# Patient Record
Sex: MALE | Race: WHITE | NOT HISPANIC OR LATINO
[De-identification: names, ages, dates, MRNs, and addresses within clinical notes are randomized per-mention and may not be internally consistent; named-entity substitution may affect disease eponyms.]

---

## 2017-01-26 ENCOUNTER — APPOINTMENT (OUTPATIENT)
Dept: NEPHROLOGY | Facility: CLINIC | Age: 72
End: 2017-01-26

## 2017-01-26 VITALS
HEART RATE: 79 BPM | HEIGHT: 66 IN | WEIGHT: 191 LBS | DIASTOLIC BLOOD PRESSURE: 86 MMHG | SYSTOLIC BLOOD PRESSURE: 145 MMHG | OXYGEN SATURATION: 98 % | BODY MASS INDEX: 30.7 KG/M2

## 2017-01-26 VITALS — BODY MASS INDEX: 22.98 KG/M2 | WEIGHT: 142.38 LBS

## 2017-01-30 LAB
ALBUMIN SERPL ELPH-MCNC: 4.3 G/DL
ALP BLD-CCNC: 77 U/L
ALT SERPL-CCNC: 44 U/L
ANION GAP SERPL CALC-SCNC: 15 MMOL/L
APPEARANCE: CLEAR
AST SERPL-CCNC: 30 U/L
BACTERIA: NEGATIVE
BASOPHILS # BLD AUTO: 0.03 K/UL
BASOPHILS NFR BLD AUTO: 0.2 %
BILIRUB SERPL-MCNC: 0.6 MG/DL
BILIRUBIN URINE: NEGATIVE
BLOOD URINE: NEGATIVE
BUN SERPL-MCNC: 28 MG/DL
CALCIUM SERPL-MCNC: 9.9 MG/DL
CHLORIDE SERPL-SCNC: 101 MMOL/L
CHOLEST SERPL-MCNC: 131 MG/DL
CHOLEST/HDLC SERPL: 1.9 RATIO
CO2 SERPL-SCNC: 24 MMOL/L
COLOR: YELLOW
CREAT SERPL-MCNC: 1.49 MG/DL
CREAT SPEC-SCNC: 32 MG/DL
CREAT/PROT UR: 0.5 RATIO
EOSINOPHIL # BLD AUTO: 0.23 K/UL
EOSINOPHIL NFR BLD AUTO: 1.9 %
GLUCOSE QUALITATIVE U: NORMAL MG/DL
GLUCOSE SERPL-MCNC: 167 MG/DL
HCT VFR BLD CALC: 44.4 %
HDLC SERPL-MCNC: 68 MG/DL
HGB BLD-MCNC: 14.7 G/DL
IMM GRANULOCYTES NFR BLD AUTO: 0.3 %
KETONES URINE: NEGATIVE
LDLC SERPL CALC-MCNC: 38 MG/DL
LEUKOCYTE ESTERASE URINE: NEGATIVE
LYMPHOCYTES # BLD AUTO: 2.72 K/UL
LYMPHOCYTES NFR BLD AUTO: 22.4 %
MAN DIFF?: NORMAL
MCHC RBC-ENTMCNC: 33 PG
MCHC RBC-ENTMCNC: 33.1 GM/DL
MCV RBC AUTO: 99.8 FL
MICROSCOPIC-UA: NORMAL
MONOCYTES # BLD AUTO: 1.26 K/UL
MONOCYTES NFR BLD AUTO: 10.4 %
NEUTROPHILS # BLD AUTO: 7.89 K/UL
NEUTROPHILS NFR BLD AUTO: 64.8 %
NITRITE URINE: NEGATIVE
PH URINE: 6
PLATELET # BLD AUTO: 242 K/UL
POTASSIUM SERPL-SCNC: 4.5 MMOL/L
PROT SERPL-MCNC: 7.2 G/DL
PROT UR-MCNC: 16 MG/DL
PROTEIN URINE: NEGATIVE MG/DL
RBC # BLD: 4.45 M/UL
RBC # FLD: 13.1 %
RED BLOOD CELLS URINE: 0 /HPF
SODIUM SERPL-SCNC: 140 MMOL/L
SPECIFIC GRAVITY URINE: 1.01
SQUAMOUS EPITHELIAL CELLS: 1 /HPF
TRIGL SERPL-MCNC: 124 MG/DL
UROBILINOGEN URINE: NORMAL MG/DL
WBC # FLD AUTO: 12.17 K/UL
WHITE BLOOD CELLS URINE: 2 /HPF

## 2017-02-06 ENCOUNTER — APPOINTMENT (OUTPATIENT)
Dept: OTOLARYNGOLOGY | Facility: CLINIC | Age: 72
End: 2017-02-06

## 2017-02-06 VITALS
DIASTOLIC BLOOD PRESSURE: 86 MMHG | HEIGHT: 66 IN | SYSTOLIC BLOOD PRESSURE: 162 MMHG | BODY MASS INDEX: 22.82 KG/M2 | WEIGHT: 142 LBS | HEART RATE: 77 BPM

## 2017-02-13 ENCOUNTER — RX RENEWAL (OUTPATIENT)
Age: 72
End: 2017-02-13

## 2017-02-22 ENCOUNTER — APPOINTMENT (OUTPATIENT)
Dept: OTOLARYNGOLOGY | Facility: CLINIC | Age: 72
End: 2017-02-22

## 2017-02-22 VITALS
HEART RATE: 75 BPM | HEIGHT: 66 IN | WEIGHT: 142 LBS | SYSTOLIC BLOOD PRESSURE: 146 MMHG | DIASTOLIC BLOOD PRESSURE: 82 MMHG | BODY MASS INDEX: 22.82 KG/M2

## 2017-02-22 DIAGNOSIS — J39.2 OTHER DISEASES OF PHARYNX: ICD-10-CM

## 2017-04-27 ENCOUNTER — APPOINTMENT (OUTPATIENT)
Dept: NEPHROLOGY | Facility: CLINIC | Age: 72
End: 2017-04-27

## 2017-04-27 VITALS
RESPIRATION RATE: 95 BRPM | HEART RATE: 70 BPM | WEIGHT: 197 LBS | DIASTOLIC BLOOD PRESSURE: 76 MMHG | HEIGHT: 65 IN | BODY MASS INDEX: 32.82 KG/M2 | SYSTOLIC BLOOD PRESSURE: 125 MMHG

## 2017-04-27 DIAGNOSIS — K27.9 PEPTIC ULCER, SITE UNSPECIFIED, UNSPECIFIED AS ACUTE OR CHRONIC, W/OUT HEMORRHAGE OR PERFORATION: ICD-10-CM

## 2017-05-01 LAB
ALBUMIN SERPL ELPH-MCNC: 4.5 G/DL
ALP BLD-CCNC: 76 U/L
ALT SERPL-CCNC: 26 U/L
ANION GAP SERPL CALC-SCNC: 19 MMOL/L
APPEARANCE: CLEAR
AST SERPL-CCNC: 20 U/L
BACTERIA: NEGATIVE
BILIRUB SERPL-MCNC: 0.6 MG/DL
BILIRUBIN URINE: NEGATIVE
BLOOD URINE: NEGATIVE
BUN SERPL-MCNC: 43 MG/DL
CALCIUM SERPL-MCNC: 10.5 MG/DL
CHLORIDE SERPL-SCNC: 103 MMOL/L
CO2 SERPL-SCNC: 19 MMOL/L
COLOR: YELLOW
CREAT SERPL-MCNC: 1.77 MG/DL
CREAT SPEC-SCNC: 28 MG/DL
CREAT/PROT UR: 0.4 RATIO
GLUCOSE QUALITATIVE U: NORMAL MG/DL
GLUCOSE SERPL-MCNC: 223 MG/DL
HYALINE CASTS: 0 /LPF
KETONES URINE: NEGATIVE
LEUKOCYTE ESTERASE URINE: NEGATIVE
MICROSCOPIC-UA: NORMAL
NITRITE URINE: NEGATIVE
PH URINE: 6.5
POTASSIUM SERPL-SCNC: 4.2 MMOL/L
PROT SERPL-MCNC: 7.2 G/DL
PROT UR-MCNC: 11 MG/DL
PROTEIN URINE: NEGATIVE MG/DL
RED BLOOD CELLS URINE: 0 /HPF
SODIUM SERPL-SCNC: 141 MMOL/L
SPECIFIC GRAVITY URINE: 1.01
SQUAMOUS EPITHELIAL CELLS: 0 /HPF
UROBILINOGEN URINE: NORMAL MG/DL
WHITE BLOOD CELLS URINE: 0 /HPF

## 2017-05-24 ENCOUNTER — APPOINTMENT (OUTPATIENT)
Dept: OTOLARYNGOLOGY | Facility: CLINIC | Age: 72
End: 2017-05-24

## 2017-05-24 VITALS
DIASTOLIC BLOOD PRESSURE: 84 MMHG | HEART RATE: 68 BPM | HEIGHT: 65 IN | BODY MASS INDEX: 32.82 KG/M2 | WEIGHT: 197 LBS | SYSTOLIC BLOOD PRESSURE: 133 MMHG

## 2017-05-24 VITALS
WEIGHT: 197 LBS | BODY MASS INDEX: 32.82 KG/M2 | SYSTOLIC BLOOD PRESSURE: 146 MMHG | DIASTOLIC BLOOD PRESSURE: 79 MMHG | HEIGHT: 65 IN | HEART RATE: 69 BPM

## 2017-07-27 ENCOUNTER — APPOINTMENT (OUTPATIENT)
Dept: NEPHROLOGY | Facility: CLINIC | Age: 72
End: 2017-07-27
Payer: COMMERCIAL

## 2017-07-27 VITALS
DIASTOLIC BLOOD PRESSURE: 84 MMHG | HEART RATE: 79 BPM | OXYGEN SATURATION: 99 % | WEIGHT: 194 LBS | SYSTOLIC BLOOD PRESSURE: 126 MMHG | HEIGHT: 65 IN | BODY MASS INDEX: 32.32 KG/M2

## 2017-07-27 PROCEDURE — 99214 OFFICE O/P EST MOD 30 MIN: CPT

## 2017-07-31 LAB
24R-OH-CALCIDIOL SERPL-MCNC: 16.4 PG/ML
25(OH)D3 SERPL-MCNC: 36.4 NG/ML
ALBUMIN SERPL ELPH-MCNC: 4.3 G/DL
ALP BLD-CCNC: 78 U/L
ALT SERPL-CCNC: 35 U/L
ANION GAP SERPL CALC-SCNC: 16 MMOL/L
APPEARANCE: CLEAR
AST SERPL-CCNC: 25 U/L
BACTERIA: NEGATIVE
BASOPHILS # BLD AUTO: 0.02 K/UL
BASOPHILS NFR BLD AUTO: 0.2 %
BILIRUB SERPL-MCNC: 0.6 MG/DL
BILIRUBIN URINE: NEGATIVE
BLOOD URINE: NEGATIVE
BUN SERPL-MCNC: 42 MG/DL
CALCIUM SERPL-MCNC: 10 MG/DL
CALCIUM SERPL-MCNC: 10 MG/DL
CHLORIDE SERPL-SCNC: 102 MMOL/L
CHOLEST SERPL-MCNC: 112 MG/DL
CHOLEST/HDLC SERPL: 1.9 RATIO
CO2 SERPL-SCNC: 22 MMOL/L
COLOR: YELLOW
CREAT SERPL-MCNC: 1.49 MG/DL
CREAT SPEC-SCNC: 17 MG/DL
CREAT/PROT UR: 0.7 RATIO
EOSINOPHIL # BLD AUTO: 0.11 K/UL
EOSINOPHIL NFR BLD AUTO: 1 %
GLUCOSE QUALITATIVE U: NORMAL MG/DL
GLUCOSE SERPL-MCNC: 154 MG/DL
HBA1C MFR BLD HPLC: 8.4 %
HCT VFR BLD CALC: 42.2 %
HDLC SERPL-MCNC: 58 MG/DL
HGB BLD-MCNC: 14.1 G/DL
IMM GRANULOCYTES NFR BLD AUTO: 0.4 %
KETONES URINE: NEGATIVE
LDLC SERPL CALC-MCNC: 34 MG/DL
LEUKOCYTE ESTERASE URINE: NEGATIVE
LYMPHOCYTES # BLD AUTO: 2.5 K/UL
LYMPHOCYTES NFR BLD AUTO: 23.3 %
MAN DIFF?: NORMAL
MCHC RBC-ENTMCNC: 31.7 PG
MCHC RBC-ENTMCNC: 33.4 GM/DL
MCV RBC AUTO: 94.8 FL
MICROSCOPIC-UA: NORMAL
MONOCYTES # BLD AUTO: 1.38 K/UL
MONOCYTES NFR BLD AUTO: 12.9 %
NEUTROPHILS # BLD AUTO: 6.67 K/UL
NEUTROPHILS NFR BLD AUTO: 62.2 %
NITRITE URINE: NEGATIVE
PARATHYROID HORMONE INTACT: 64 PG/ML
PH URINE: 6
PHOSPHATE SERPL-MCNC: 3 MG/DL
PLATELET # BLD AUTO: 240 K/UL
POTASSIUM SERPL-SCNC: 4.4 MMOL/L
PROT SERPL-MCNC: 7.2 G/DL
PROT UR-MCNC: 12 MG/DL
PROTEIN URINE: NEGATIVE MG/DL
RBC # BLD: 4.45 M/UL
RBC # FLD: 13.6 %
RED BLOOD CELLS URINE: 0 /HPF
SODIUM SERPL-SCNC: 140 MMOL/L
SPECIFIC GRAVITY URINE: 1.01
SQUAMOUS EPITHELIAL CELLS: 0 /HPF
TRIGL SERPL-MCNC: 98 MG/DL
UROBILINOGEN URINE: NORMAL MG/DL
WBC # FLD AUTO: 10.72 K/UL
WHITE BLOOD CELLS URINE: 0 /HPF

## 2017-08-14 ENCOUNTER — APPOINTMENT (OUTPATIENT)
Dept: OTOLARYNGOLOGY | Facility: CLINIC | Age: 72
End: 2017-08-14
Payer: COMMERCIAL

## 2017-08-14 VITALS
BODY MASS INDEX: 32.65 KG/M2 | HEIGHT: 65 IN | WEIGHT: 196 LBS | DIASTOLIC BLOOD PRESSURE: 69 MMHG | SYSTOLIC BLOOD PRESSURE: 124 MMHG | HEART RATE: 72 BPM

## 2017-08-14 PROCEDURE — 92557 COMPREHENSIVE HEARING TEST: CPT

## 2017-08-14 PROCEDURE — G0268 REMOVAL OF IMPACTED WAX MD: CPT

## 2017-08-14 PROCEDURE — 92567 TYMPANOMETRY: CPT

## 2017-08-14 PROCEDURE — 99213 OFFICE O/P EST LOW 20 MIN: CPT | Mod: 25

## 2017-08-16 ENCOUNTER — APPOINTMENT (OUTPATIENT)
Dept: OTOLARYNGOLOGY | Facility: CLINIC | Age: 72
End: 2017-08-16
Payer: COMMERCIAL

## 2017-08-16 VITALS
SYSTOLIC BLOOD PRESSURE: 120 MMHG | BODY MASS INDEX: 32.65 KG/M2 | DIASTOLIC BLOOD PRESSURE: 68 MMHG | WEIGHT: 196 LBS | HEART RATE: 71 BPM | HEIGHT: 65 IN

## 2017-08-16 PROCEDURE — 69210 REMOVE IMPACTED EAR WAX UNI: CPT

## 2017-08-16 PROCEDURE — 99214 OFFICE O/P EST MOD 30 MIN: CPT | Mod: 25

## 2017-08-16 RX ORDER — NEOMYCIN SULFATE, POLYMYXIN B SULFATE AND HYDROCORTISONE 3.5; 10000; 1 MG/ML; [IU]/ML; MG/ML
3.5-10000-1 SOLUTION AURICULAR (OTIC)
Qty: 10 | Refills: 5 | Status: ACTIVE | COMMUNITY
Start: 2017-08-16 | End: 1900-01-01

## 2017-08-29 ENCOUNTER — APPOINTMENT (OUTPATIENT)
Dept: OTOLARYNGOLOGY | Facility: CLINIC | Age: 72
End: 2017-08-29
Payer: COMMERCIAL

## 2017-08-29 ENCOUNTER — APPOINTMENT (OUTPATIENT)
Dept: PHARMACY | Facility: CLINIC | Age: 72
End: 2017-08-29
Payer: SELF-PAY

## 2017-08-29 VITALS
WEIGHT: 196 LBS | BODY MASS INDEX: 32.65 KG/M2 | SYSTOLIC BLOOD PRESSURE: 121 MMHG | HEART RATE: 75 BPM | HEIGHT: 65 IN | DIASTOLIC BLOOD PRESSURE: 78 MMHG

## 2017-08-29 PROCEDURE — 99213 OFFICE O/P EST LOW 20 MIN: CPT

## 2017-08-29 PROCEDURE — V5014E: CUSTOM

## 2017-08-29 RX ORDER — FLUTICASONE PROPIONATE AND SALMETEROL 50; 250 UG/1; UG/1
250-50 POWDER RESPIRATORY (INHALATION)
Qty: 60 | Refills: 0 | Status: ACTIVE | COMMUNITY
Start: 2017-05-05

## 2017-08-29 RX ORDER — INSULIN ASPART 100 [IU]/ML
100 INJECTION, SOLUTION INTRAVENOUS; SUBCUTANEOUS
Qty: 15 | Refills: 0 | Status: ACTIVE | COMMUNITY
Start: 2017-05-17

## 2017-08-29 RX ORDER — GLIMEPIRIDE 4 MG/1
4 TABLET ORAL
Qty: 180 | Refills: 0 | Status: ACTIVE | COMMUNITY
Start: 2017-06-15

## 2017-08-29 RX ORDER — BLOOD SUGAR DIAGNOSTIC
STRIP MISCELLANEOUS
Qty: 900 | Refills: 0 | Status: ACTIVE | COMMUNITY
Start: 2017-03-11

## 2017-09-06 ENCOUNTER — APPOINTMENT (OUTPATIENT)
Dept: OTOLARYNGOLOGY | Facility: CLINIC | Age: 72
End: 2017-09-06

## 2017-09-18 ENCOUNTER — APPOINTMENT (OUTPATIENT)
Dept: PHARMACY | Facility: CLINIC | Age: 72
End: 2017-09-18
Payer: SELF-PAY

## 2017-09-18 PROCEDURE — V5266B: CUSTOM

## 2017-09-25 ENCOUNTER — APPOINTMENT (OUTPATIENT)
Dept: PHARMACY | Facility: CLINIC | Age: 72
End: 2017-09-25
Payer: SELF-PAY

## 2017-09-25 PROCEDURE — V5299A: CUSTOM | Mod: NC

## 2017-11-09 ENCOUNTER — APPOINTMENT (OUTPATIENT)
Dept: NEPHROLOGY | Facility: CLINIC | Age: 72
End: 2017-11-09

## 2018-02-05 ENCOUNTER — APPOINTMENT (OUTPATIENT)
Dept: OTOLARYNGOLOGY | Facility: CLINIC | Age: 73
End: 2018-02-05
Payer: COMMERCIAL

## 2018-02-05 VITALS
DIASTOLIC BLOOD PRESSURE: 87 MMHG | HEIGHT: 65 IN | WEIGHT: 196 LBS | SYSTOLIC BLOOD PRESSURE: 163 MMHG | BODY MASS INDEX: 32.65 KG/M2 | HEART RATE: 71 BPM

## 2018-02-05 DIAGNOSIS — H60.312 DIFFUSE OTITIS EXTERNA, LEFT EAR: ICD-10-CM

## 2018-02-05 PROCEDURE — 69210 REMOVE IMPACTED EAR WAX UNI: CPT

## 2018-02-05 PROCEDURE — 99214 OFFICE O/P EST MOD 30 MIN: CPT | Mod: 25

## 2018-02-16 ENCOUNTER — APPOINTMENT (OUTPATIENT)
Dept: OTOLARYNGOLOGY | Facility: CLINIC | Age: 73
End: 2018-02-16
Payer: COMMERCIAL

## 2018-02-16 VITALS
BODY MASS INDEX: 32.65 KG/M2 | DIASTOLIC BLOOD PRESSURE: 73 MMHG | WEIGHT: 196 LBS | HEIGHT: 65 IN | HEART RATE: 74 BPM | SYSTOLIC BLOOD PRESSURE: 139 MMHG

## 2018-02-16 DIAGNOSIS — H90.3 SENSORINEURAL HEARING LOSS, BILATERAL: ICD-10-CM

## 2018-02-16 DIAGNOSIS — J31.0 CHRONIC RHINITIS: ICD-10-CM

## 2018-02-16 DIAGNOSIS — H61.23 IMPACTED CERUMEN, BILATERAL: ICD-10-CM

## 2018-02-16 DIAGNOSIS — E66.9 OBESITY, UNSPECIFIED: ICD-10-CM

## 2018-02-16 DIAGNOSIS — J34.2 DEVIATED NASAL SEPTUM: ICD-10-CM

## 2018-02-16 DIAGNOSIS — J30.0 VASOMOTOR RHINITIS: ICD-10-CM

## 2018-02-16 PROCEDURE — 31231 NASAL ENDOSCOPY DX: CPT

## 2018-02-16 PROCEDURE — 99214 OFFICE O/P EST MOD 30 MIN: CPT | Mod: 25

## 2018-02-16 RX ORDER — AZELASTINE HYDROCHLORIDE 137 UG/1
0.1 SPRAY, METERED NASAL DAILY
Qty: 1 | Refills: 10 | Status: ACTIVE | COMMUNITY
Start: 2018-02-16 | End: 1900-01-01

## 2018-02-20 ENCOUNTER — RX RENEWAL (OUTPATIENT)
Age: 73
End: 2018-02-20

## 2018-02-20 ENCOUNTER — MEDICATION RENEWAL (OUTPATIENT)
Age: 73
End: 2018-02-20

## 2018-02-20 ENCOUNTER — MESSAGE (OUTPATIENT)
Age: 73
End: 2018-02-20

## 2018-02-20 RX ORDER — FLUTICASONE PROPIONATE 50 UG/1
50 SPRAY, METERED NASAL DAILY
Qty: 1 | Refills: 5 | Status: ACTIVE | COMMUNITY
Start: 2018-02-20 | End: 1900-01-01

## 2018-02-26 ENCOUNTER — RX RENEWAL (OUTPATIENT)
Age: 73
End: 2018-02-26

## 2018-02-28 ENCOUNTER — APPOINTMENT (OUTPATIENT)
Dept: NEPHROLOGY | Facility: CLINIC | Age: 73
End: 2018-02-28
Payer: COMMERCIAL

## 2018-02-28 ENCOUNTER — LABORATORY RESULT (OUTPATIENT)
Age: 73
End: 2018-02-28

## 2018-02-28 ENCOUNTER — MOBILE ON CALL (OUTPATIENT)
Age: 73
End: 2018-02-28

## 2018-02-28 VITALS — BODY MASS INDEX: 32.28 KG/M2 | SYSTOLIC BLOOD PRESSURE: 126 MMHG | WEIGHT: 194 LBS | DIASTOLIC BLOOD PRESSURE: 74 MMHG

## 2018-02-28 PROCEDURE — 99214 OFFICE O/P EST MOD 30 MIN: CPT

## 2018-02-28 RX ORDER — HYDRALAZINE HYDROCHLORIDE 25 MG/1
25 TABLET ORAL
Qty: 90 | Refills: 0 | Status: DISCONTINUED | COMMUNITY
Start: 2018-02-13 | End: 2018-02-28

## 2018-03-01 LAB
ALBUMIN SERPL ELPH-MCNC: 4.9 G/DL
ALP BLD-CCNC: 77 U/L
ALT SERPL-CCNC: 27 U/L
ANION GAP SERPL CALC-SCNC: 14 MMOL/L
APPEARANCE: CLEAR
AST SERPL-CCNC: 25 U/L
BACTERIA: NEGATIVE
BASOPHILS # BLD AUTO: 0.04 K/UL
BASOPHILS NFR BLD AUTO: 0.4 %
BILIRUB SERPL-MCNC: 0.7 MG/DL
BILIRUBIN URINE: NEGATIVE
BLOOD URINE: NEGATIVE
BUN SERPL-MCNC: 38 MG/DL
CALCIUM SERPL-MCNC: 10.6 MG/DL
CALCIUM SERPL-MCNC: 10.6 MG/DL
CHLORIDE SERPL-SCNC: 99 MMOL/L
CHOLEST SERPL-MCNC: 129 MG/DL
CHOLEST/HDLC SERPL: 2.1 RATIO
CO2 SERPL-SCNC: 24 MMOL/L
COLOR: YELLOW
CREAT SERPL-MCNC: 1.83 MG/DL
CREAT SPEC-SCNC: 68 MG/DL
CREAT/PROT UR: 0.4 RATIO
EOSINOPHIL # BLD AUTO: 0.14 K/UL
EOSINOPHIL NFR BLD AUTO: 1.4 %
FERRITIN SERPL-MCNC: 238 NG/ML
GLUCOSE QUALITATIVE U: NEGATIVE MG/DL
GLUCOSE SERPL-MCNC: 172 MG/DL
HCT VFR BLD CALC: 42.8 %
HDLC SERPL-MCNC: 62 MG/DL
HGB BLD-MCNC: 14.1 G/DL
HYALINE CASTS: 3 /LPF
IMM GRANULOCYTES NFR BLD AUTO: 0.4 %
IRON SATN MFR SERPL: 29 %
IRON SERPL-MCNC: 104 UG/DL
KETONES URINE: NEGATIVE
LDLC SERPL CALC-MCNC: 46 MG/DL
LEUKOCYTE ESTERASE URINE: NEGATIVE
LYMPHOCYTES # BLD AUTO: 2.39 K/UL
LYMPHOCYTES NFR BLD AUTO: 24.4 %
MAN DIFF?: NORMAL
MCHC RBC-ENTMCNC: 32 PG
MCHC RBC-ENTMCNC: 32.9 GM/DL
MCV RBC AUTO: 97.1 FL
MICROSCOPIC-UA: NORMAL
MONOCYTES # BLD AUTO: 0.98 K/UL
MONOCYTES NFR BLD AUTO: 10 %
MPO AB + PR3 PNL SER: NORMAL
NEUTROPHILS # BLD AUTO: 6.21 K/UL
NEUTROPHILS NFR BLD AUTO: 63.4 %
NITRITE URINE: NEGATIVE
PARATHYROID HORMONE INTACT: 59 PG/ML
PH URINE: 5.5
PHOSPHATE SERPL-MCNC: 3.5 MG/DL
PLATELET # BLD AUTO: 255 K/UL
POTASSIUM SERPL-SCNC: 4.3 MMOL/L
PROT SERPL-MCNC: 7.2 G/DL
PROT UR-MCNC: 29 MG/DL
PROTEIN URINE: 30 MG/DL
PSA SERPL-MCNC: <0.01 NG/ML
RBC # BLD: 4.41 M/UL
RBC # FLD: 13.7 %
RED BLOOD CELLS URINE: 0 /HPF
SODIUM SERPL-SCNC: 137 MMOL/L
SPECIFIC GRAVITY URINE: 1.01
SQUAMOUS EPITHELIAL CELLS: 0 /HPF
TIBC SERPL-MCNC: 361 UG/DL
TRIGL SERPL-MCNC: 106 MG/DL
UIBC SERPL-MCNC: 257 UG/DL
UROBILINOGEN URINE: NEGATIVE MG/DL
WBC # FLD AUTO: 9.8 K/UL
WHITE BLOOD CELLS URINE: 0 /HPF

## 2018-03-09 LAB — HISTONE AB SER QL: 0.8 UNITS

## 2018-03-23 ENCOUNTER — OTHER (OUTPATIENT)
Age: 73
End: 2018-03-23

## 2018-04-06 ENCOUNTER — APPOINTMENT (OUTPATIENT)
Dept: OTOLARYNGOLOGY | Facility: CLINIC | Age: 73
End: 2018-04-06

## 2018-04-18 ENCOUNTER — APPOINTMENT (OUTPATIENT)
Dept: VASCULAR SURGERY | Facility: CLINIC | Age: 73
End: 2018-04-18

## 2018-05-02 ENCOUNTER — APPOINTMENT (OUTPATIENT)
Dept: VASCULAR SURGERY | Facility: CLINIC | Age: 73
End: 2018-05-02
Payer: COMMERCIAL

## 2018-05-02 VITALS
HEART RATE: 75 BPM | DIASTOLIC BLOOD PRESSURE: 78 MMHG | TEMPERATURE: 97.5 F | HEIGHT: 65 IN | WEIGHT: 194 LBS | BODY MASS INDEX: 32.32 KG/M2 | SYSTOLIC BLOOD PRESSURE: 151 MMHG

## 2018-05-02 DIAGNOSIS — L85.3 XEROSIS CUTIS: ICD-10-CM

## 2018-05-02 PROCEDURE — 93970 EXTREMITY STUDY: CPT

## 2018-05-02 PROCEDURE — 99243 OFF/OP CNSLTJ NEW/EST LOW 30: CPT

## 2018-05-02 PROCEDURE — 93979 VASCULAR STUDY: CPT

## 2018-05-10 ENCOUNTER — APPOINTMENT (OUTPATIENT)
Dept: NEPHROLOGY | Facility: CLINIC | Age: 73
End: 2018-05-10
Payer: COMMERCIAL

## 2018-05-10 VITALS
DIASTOLIC BLOOD PRESSURE: 74 MMHG | HEART RATE: 73 BPM | BODY MASS INDEX: 33.15 KG/M2 | WEIGHT: 199 LBS | OXYGEN SATURATION: 96 % | SYSTOLIC BLOOD PRESSURE: 121 MMHG | HEIGHT: 65 IN

## 2018-05-10 VITALS — DIASTOLIC BLOOD PRESSURE: 78 MMHG | SYSTOLIC BLOOD PRESSURE: 129 MMHG

## 2018-05-10 PROCEDURE — 99214 OFFICE O/P EST MOD 30 MIN: CPT

## 2018-05-14 LAB
ALBUMIN SERPL ELPH-MCNC: 4 G/DL
ALBUMIN SERPL ELPH-MCNC: 4.1 G/DL
ALP BLD-CCNC: 87 U/L
ALP BLD-CCNC: 89 U/L
ALT SERPL-CCNC: 34 U/L
ALT SERPL-CCNC: 34 U/L
ANA PAT FLD IF-IMP: ABNORMAL
ANA SER IF-ACNC: ABNORMAL
ANION GAP SERPL CALC-SCNC: 16 MMOL/L
ANION GAP SERPL CALC-SCNC: 16 MMOL/L
APPEARANCE: CLEAR
AST SERPL-CCNC: 25 U/L
AST SERPL-CCNC: 26 U/L
BACTERIA: NEGATIVE
BASOPHILS # BLD AUTO: 0.01 K/UL
BASOPHILS # BLD AUTO: 0.02 K/UL
BASOPHILS NFR BLD AUTO: 0.1 %
BASOPHILS NFR BLD AUTO: 0.2 %
BILIRUB SERPL-MCNC: 0.5 MG/DL
BILIRUB SERPL-MCNC: 0.6 MG/DL
BILIRUBIN URINE: NEGATIVE
BLOOD URINE: NEGATIVE
BUN SERPL-MCNC: 33 MG/DL
BUN SERPL-MCNC: 35 MG/DL
CALCIUM SERPL-MCNC: 10.8 MG/DL
CHLORIDE SERPL-SCNC: 100 MMOL/L
CHLORIDE SERPL-SCNC: 100 MMOL/L
CO2 SERPL-SCNC: 24 MMOL/L
CO2 SERPL-SCNC: 24 MMOL/L
COLOR: YELLOW
CREAT SERPL-MCNC: 1.57 MG/DL
CREAT SERPL-MCNC: 1.59 MG/DL
CREAT SPEC-SCNC: 14 MG/DL
CREAT/PROT UR: 0.9 RATIO
EOSINOPHIL # BLD AUTO: 0.17 K/UL
EOSINOPHIL # BLD AUTO: 0.18 K/UL
EOSINOPHIL NFR BLD AUTO: 2 %
EOSINOPHIL NFR BLD AUTO: 2 %
FERRITIN SERPL-MCNC: 182 NG/ML
FERRITIN SERPL-MCNC: 186 NG/ML
GLUCOSE QUALITATIVE U: NEGATIVE MG/DL
GLUCOSE SERPL-MCNC: 150 MG/DL
GLUCOSE SERPL-MCNC: 151 MG/DL
HBA1C MFR BLD HPLC: 8.5 %
HCT VFR BLD CALC: 42.6 %
HCT VFR BLD CALC: 42.8 %
HGB BLD-MCNC: 13.9 G/DL
HGB BLD-MCNC: 14.1 G/DL
HYALINE CASTS: 0 /LPF
IMM GRANULOCYTES NFR BLD AUTO: 0.3 %
IMM GRANULOCYTES NFR BLD AUTO: 0.6 %
IRON SATN MFR SERPL: 35 %
IRON SATN MFR SERPL: 37 %
IRON SERPL-MCNC: 116 UG/DL
IRON SERPL-MCNC: 117 UG/DL
KETONES URINE: NEGATIVE
LEUKOCYTE ESTERASE URINE: NEGATIVE
LYMPHOCYTES # BLD AUTO: 2.31 K/UL
LYMPHOCYTES # BLD AUTO: 2.35 K/UL
LYMPHOCYTES NFR BLD AUTO: 26.2 %
LYMPHOCYTES NFR BLD AUTO: 27 %
MAN DIFF?: NORMAL
MAN DIFF?: NORMAL
MCHC RBC-ENTMCNC: 31.7 PG
MCHC RBC-ENTMCNC: 32.3 PG
MCHC RBC-ENTMCNC: 32.6 GM/DL
MCHC RBC-ENTMCNC: 32.9 GM/DL
MCV RBC AUTO: 97.3 FL
MCV RBC AUTO: 97.9 FL
MICROSCOPIC-UA: NORMAL
MONOCYTES # BLD AUTO: 0.8 K/UL
MONOCYTES # BLD AUTO: 0.89 K/UL
MONOCYTES NFR BLD AUTO: 10.1 %
MONOCYTES NFR BLD AUTO: 9.2 %
NEUTROPHILS # BLD AUTO: 5.32 K/UL
NEUTROPHILS # BLD AUTO: 5.4 K/UL
NEUTROPHILS NFR BLD AUTO: 61 %
NEUTROPHILS NFR BLD AUTO: 61.3 %
NITRITE URINE: NEGATIVE
PARATHYROID HORMONE INTACT: 48 PG/ML
PARATHYROID HORMONE INTACT: 49 PG/ML
PH URINE: 5.5
PHOSPHATE SERPL-MCNC: 2.8 MG/DL
PHOSPHATE SERPL-MCNC: 2.9 MG/DL
PLATELET # BLD AUTO: 245 K/UL
PLATELET # BLD AUTO: 261 K/UL
POTASSIUM SERPL-SCNC: 4.3 MMOL/L
POTASSIUM SERPL-SCNC: 4.6 MMOL/L
PROT SERPL-MCNC: 7.2 G/DL
PROT SERPL-MCNC: 7.2 G/DL
PROT UR-MCNC: 13 MG/DL
PROTEIN URINE: NEGATIVE MG/DL
RBC # BLD: 4.37 M/UL
RBC # BLD: 4.38 M/UL
RBC # FLD: 13.7 %
RBC # FLD: 13.9 %
RED BLOOD CELLS URINE: 0 /HPF
SODIUM SERPL-SCNC: 140 MMOL/L
SODIUM SERPL-SCNC: 140 MMOL/L
SPECIFIC GRAVITY URINE: 1.01
SQUAMOUS EPITHELIAL CELLS: 0 /HPF
TIBC SERPL-MCNC: 311 UG/DL
TIBC SERPL-MCNC: 332 UG/DL
UIBC SERPL-MCNC: 195 UG/DL
UIBC SERPL-MCNC: 215 UG/DL
UROBILINOGEN URINE: NEGATIVE MG/DL
WBC # FLD AUTO: 8.71 K/UL
WBC # FLD AUTO: 8.82 K/UL
WHITE BLOOD CELLS URINE: 0 /HPF

## 2018-05-15 LAB — HISTONE AB SER QL: 0.9 UNITS

## 2018-05-19 ENCOUNTER — FORM ENCOUNTER (OUTPATIENT)
Age: 73
End: 2018-05-19

## 2018-05-20 ENCOUNTER — APPOINTMENT (OUTPATIENT)
Dept: MRI IMAGING | Facility: IMAGING CENTER | Age: 73
End: 2018-05-20
Payer: COMMERCIAL

## 2018-05-20 ENCOUNTER — OUTPATIENT (OUTPATIENT)
Dept: OUTPATIENT SERVICES | Facility: HOSPITAL | Age: 73
LOS: 1 days | End: 2018-05-20
Payer: COMMERCIAL

## 2018-05-20 DIAGNOSIS — Z00.8 ENCOUNTER FOR OTHER GENERAL EXAMINATION: ICD-10-CM

## 2018-05-20 PROCEDURE — C8919: CPT

## 2018-05-20 PROCEDURE — 74185 MRA ABD W OR W/O CNTRST: CPT | Mod: 26

## 2018-05-20 PROCEDURE — C8901: CPT

## 2018-05-20 PROCEDURE — 72198 MR ANGIO PELVIS W/O & W/DYE: CPT | Mod: 26

## 2018-07-03 ENCOUNTER — APPOINTMENT (OUTPATIENT)
Dept: VASCULAR SURGERY | Facility: CLINIC | Age: 73
End: 2018-07-03
Payer: COMMERCIAL

## 2018-07-03 VITALS
WEIGHT: 198 LBS | HEIGHT: 65 IN | SYSTOLIC BLOOD PRESSURE: 138 MMHG | HEART RATE: 69 BPM | BODY MASS INDEX: 32.99 KG/M2 | DIASTOLIC BLOOD PRESSURE: 76 MMHG | TEMPERATURE: 98 F

## 2018-07-03 PROCEDURE — 99213 OFFICE O/P EST LOW 20 MIN: CPT

## 2018-07-19 ENCOUNTER — APPOINTMENT (OUTPATIENT)
Dept: NEPHROLOGY | Facility: CLINIC | Age: 73
End: 2018-07-19
Payer: COMMERCIAL

## 2018-07-19 VITALS
DIASTOLIC BLOOD PRESSURE: 73 MMHG | OXYGEN SATURATION: 98 % | WEIGHT: 199.3 LBS | HEIGHT: 65 IN | SYSTOLIC BLOOD PRESSURE: 121 MMHG | HEART RATE: 72 BPM | BODY MASS INDEX: 33.2 KG/M2

## 2018-07-19 PROCEDURE — 99215 OFFICE O/P EST HI 40 MIN: CPT

## 2018-07-20 LAB
ALBUMIN SERPL ELPH-MCNC: 4.5 G/DL
ALP BLD-CCNC: 99 U/L
ALT SERPL-CCNC: 46 U/L
ANION GAP SERPL CALC-SCNC: 16 MMOL/L
AST SERPL-CCNC: 32 U/L
BASOPHILS # BLD AUTO: 0.02 K/UL
BASOPHILS NFR BLD AUTO: 0.2 %
BILIRUB SERPL-MCNC: 0.6 MG/DL
BUN SERPL-MCNC: 44 MG/DL
CALCIUM SERPL-MCNC: 10.2 MG/DL
CALCIUM SERPL-MCNC: 10.2 MG/DL
CHLORIDE SERPL-SCNC: 102 MMOL/L
CHOLEST SERPL-MCNC: 123 MG/DL
CHOLEST/HDLC SERPL: 1.8 RATIO
CO2 SERPL-SCNC: 24 MMOL/L
CREAT SERPL-MCNC: 1.67 MG/DL
EOSINOPHIL # BLD AUTO: 0.19 K/UL
EOSINOPHIL NFR BLD AUTO: 2 %
FERRITIN SERPL-MCNC: 160 NG/ML
GLUCOSE SERPL-MCNC: 156 MG/DL
HCT VFR BLD CALC: 44.6 %
HDLC SERPL-MCNC: 68 MG/DL
HGB BLD-MCNC: 14.7 G/DL
IMM GRANULOCYTES NFR BLD AUTO: 0.4 %
IRON SATN MFR SERPL: 31 %
IRON SERPL-MCNC: 123 UG/DL
LDLC SERPL CALC-MCNC: 40 MG/DL
LYMPHOCYTES # BLD AUTO: 2.53 K/UL
LYMPHOCYTES NFR BLD AUTO: 26.7 %
MAN DIFF?: NORMAL
MCHC RBC-ENTMCNC: 32.2 PG
MCHC RBC-ENTMCNC: 33 GM/DL
MCV RBC AUTO: 97.6 FL
MONOCYTES # BLD AUTO: 0.92 K/UL
MONOCYTES NFR BLD AUTO: 9.7 %
NEUTROPHILS # BLD AUTO: 5.79 K/UL
NEUTROPHILS NFR BLD AUTO: 61 %
PARATHYROID HORMONE INTACT: 84 PG/ML
PHOSPHATE SERPL-MCNC: 3.5 MG/DL
PLATELET # BLD AUTO: 239 K/UL
POTASSIUM SERPL-SCNC: 5.9 MMOL/L
PROT SERPL-MCNC: 7.1 G/DL
RBC # BLD: 4.57 M/UL
RBC # FLD: 14.5 %
SODIUM SERPL-SCNC: 143 MMOL/L
TIBC SERPL-MCNC: 402 UG/DL
TRIGL SERPL-MCNC: 76 MG/DL
UIBC SERPL-MCNC: 279 UG/DL
WBC # FLD AUTO: 9.49 K/UL

## 2018-07-24 ENCOUNTER — APPOINTMENT (OUTPATIENT)
Dept: NEPHROLOGY | Facility: CLINIC | Age: 73
End: 2018-07-24

## 2018-07-25 LAB
APPEARANCE: CLEAR
BACTERIA: NEGATIVE
BILIRUBIN URINE: NEGATIVE
BLOOD URINE: NEGATIVE
COLOR: YELLOW
CREAT SPEC-SCNC: 18 MG/DL
CREAT/PROT UR: 0.5 RATIO
GLUCOSE QUALITATIVE U: 1000 MG/DL
HBA1C MFR BLD HPLC: 8.6 %
HYALINE CASTS: 0 /LPF
KETONES URINE: NEGATIVE
LEUKOCYTE ESTERASE URINE: NEGATIVE
MICROSCOPIC-UA: NORMAL
NITRITE URINE: NEGATIVE
PH URINE: 6
PROT UR-MCNC: 8 MG/DL
PROTEIN URINE: NEGATIVE MG/DL
PSA SERPL-MCNC: <0.01 NG/ML
RED BLOOD CELLS URINE: 0 /HPF
SPECIFIC GRAVITY URINE: 1.01
SQUAMOUS EPITHELIAL CELLS: 0 /HPF
UROBILINOGEN URINE: NEGATIVE MG/DL
WHITE BLOOD CELLS URINE: 0 /HPF

## 2018-08-07 ENCOUNTER — APPOINTMENT (OUTPATIENT)
Dept: NEPHROLOGY | Facility: CLINIC | Age: 73
End: 2018-08-07

## 2018-08-09 LAB
ANION GAP SERPL CALC-SCNC: 14 MMOL/L
BUN SERPL-MCNC: 47 MG/DL
CALCIUM SERPL-MCNC: 10.3 MG/DL
CHLORIDE SERPL-SCNC: 103 MMOL/L
CO2 SERPL-SCNC: 22 MMOL/L
CREAT SERPL-MCNC: 1.8 MG/DL
FRUCTOSAMINE SERPL-MCNC: 281 UMOL/L
GLUCOSE SERPL-MCNC: 135 MG/DL
POTASSIUM SERPL-SCNC: 4.1 MMOL/L
SODIUM SERPL-SCNC: 139 MMOL/L

## 2018-08-30 ENCOUNTER — RESULT REVIEW (OUTPATIENT)
Age: 73
End: 2018-08-30

## 2018-09-10 ENCOUNTER — APPOINTMENT (OUTPATIENT)
Dept: VASCULAR SURGERY | Facility: CLINIC | Age: 73
End: 2018-09-10

## 2018-10-04 ENCOUNTER — APPOINTMENT (OUTPATIENT)
Dept: NEPHROLOGY | Facility: CLINIC | Age: 73
End: 2018-10-04

## 2018-10-08 ENCOUNTER — OTHER (OUTPATIENT)
Age: 73
End: 2018-10-08

## 2018-10-29 ENCOUNTER — LABORATORY RESULT (OUTPATIENT)
Age: 73
End: 2018-10-29

## 2018-10-29 ENCOUNTER — APPOINTMENT (OUTPATIENT)
Dept: NEPHROLOGY | Facility: CLINIC | Age: 73
End: 2018-10-29
Payer: COMMERCIAL

## 2018-10-29 VITALS
SYSTOLIC BLOOD PRESSURE: 127 MMHG | HEIGHT: 65 IN | HEART RATE: 70 BPM | DIASTOLIC BLOOD PRESSURE: 78 MMHG | OXYGEN SATURATION: 96 %

## 2018-10-29 DIAGNOSIS — E61.1 IRON DEFICIENCY: ICD-10-CM

## 2018-10-29 PROCEDURE — 99213 OFFICE O/P EST LOW 20 MIN: CPT

## 2018-11-01 LAB
ALBUMIN SERPL ELPH-MCNC: 4.3 G/DL
ALP BLD-CCNC: 91 U/L
ALT SERPL-CCNC: 37 U/L
ANION GAP SERPL CALC-SCNC: 13 MMOL/L
APPEARANCE: CLEAR
AST SERPL-CCNC: 23 U/L
BACTERIA: NEGATIVE
BASOPHILS # BLD AUTO: 0.02 K/UL
BASOPHILS NFR BLD AUTO: 0.2 %
BILIRUB SERPL-MCNC: 0.5 MG/DL
BILIRUBIN URINE: NEGATIVE
BLOOD URINE: NEGATIVE
BUN SERPL-MCNC: 36 MG/DL
CALCIUM SERPL-MCNC: 10.1 MG/DL
CALCIUM SERPL-MCNC: 10.1 MG/DL
CHLORIDE SERPL-SCNC: 104 MMOL/L
CO2 SERPL-SCNC: 26 MMOL/L
COLOR: YELLOW
CREAT SERPL-MCNC: 1.8 MG/DL
CREAT SPEC-SCNC: 14 MG/DL
CREAT/PROT UR: 0.4 RATIO
EOSINOPHIL # BLD AUTO: 0.17 K/UL
EOSINOPHIL NFR BLD AUTO: 1.7 %
FERRITIN SERPL-MCNC: 132 NG/ML
GLUCOSE QUALITATIVE U: 500 MG/DL
GLUCOSE SERPL-MCNC: 147 MG/DL
HCT VFR BLD CALC: 45.9 %
HGB BLD-MCNC: 15.2 G/DL
IMM GRANULOCYTES NFR BLD AUTO: 0.4 %
IRON SATN MFR SERPL: 19 %
IRON SERPL-MCNC: 68 UG/DL
KETONES URINE: NEGATIVE
LEUKOCYTE ESTERASE URINE: NEGATIVE
LYMPHOCYTES # BLD AUTO: 2.84 K/UL
LYMPHOCYTES NFR BLD AUTO: 27.9 %
MAN DIFF?: NORMAL
MCHC RBC-ENTMCNC: 32.1 PG
MCHC RBC-ENTMCNC: 33.1 GM/DL
MCV RBC AUTO: 96.8 FL
MICROSCOPIC-UA: NORMAL
MONOCYTES # BLD AUTO: 1.17 K/UL
MONOCYTES NFR BLD AUTO: 11.5 %
NEUTROPHILS # BLD AUTO: 5.93 K/UL
NEUTROPHILS NFR BLD AUTO: 58.3 %
NITRITE URINE: NEGATIVE
PARATHYROID HORMONE INTACT: 106 PG/ML
PH URINE: 6.5
PHOSPHATE SERPL-MCNC: 3.5 MG/DL
PLATELET # BLD AUTO: 218 K/UL
POTASSIUM SERPL-SCNC: 4.7 MMOL/L
PROT SERPL-MCNC: 6.7 G/DL
PROT UR-MCNC: 5 MG/DL
PROTEIN URINE: NEGATIVE MG/DL
RBC # BLD: 4.74 M/UL
RBC # FLD: 14.3 %
RED BLOOD CELLS URINE: 0 /HPF
SODIUM SERPL-SCNC: 143 MMOL/L
SPECIFIC GRAVITY URINE: 1.01
SQUAMOUS EPITHELIAL CELLS: 0 /HPF
TIBC SERPL-MCNC: 352 UG/DL
UIBC SERPL-MCNC: 284 UG/DL
UROBILINOGEN URINE: NEGATIVE MG/DL
WBC # FLD AUTO: 10.17 K/UL
WHITE BLOOD CELLS URINE: 0 /HPF

## 2018-11-06 ENCOUNTER — APPOINTMENT (OUTPATIENT)
Dept: VASCULAR SURGERY | Facility: CLINIC | Age: 73
End: 2018-11-06
Payer: COMMERCIAL

## 2018-11-06 VITALS
DIASTOLIC BLOOD PRESSURE: 62 MMHG | HEIGHT: 65 IN | BODY MASS INDEX: 34.16 KG/M2 | HEART RATE: 73 BPM | SYSTOLIC BLOOD PRESSURE: 116 MMHG | TEMPERATURE: 98.1 F | WEIGHT: 205 LBS

## 2018-11-06 DIAGNOSIS — R60.9 EDEMA, UNSPECIFIED: ICD-10-CM

## 2018-11-06 PROCEDURE — 99213 OFFICE O/P EST LOW 20 MIN: CPT

## 2018-12-20 ENCOUNTER — OTHER (OUTPATIENT)
Age: 73
End: 2018-12-20

## 2019-01-24 ENCOUNTER — APPOINTMENT (OUTPATIENT)
Dept: NEPHROLOGY | Facility: CLINIC | Age: 74
End: 2019-01-24

## 2019-01-28 ENCOUNTER — APPOINTMENT (OUTPATIENT)
Dept: VASCULAR SURGERY | Facility: CLINIC | Age: 74
End: 2019-01-28

## 2019-02-18 ENCOUNTER — APPOINTMENT (OUTPATIENT)
Dept: ORTHOPEDIC SURGERY | Facility: CLINIC | Age: 74
End: 2019-02-18
Payer: COMMERCIAL

## 2019-02-18 VITALS — WEIGHT: 205 LBS | HEIGHT: 65 IN | BODY MASS INDEX: 34.16 KG/M2

## 2019-02-18 DIAGNOSIS — E11.9 TYPE 2 DIABETES MELLITUS W/OUT COMPLICATIONS: ICD-10-CM

## 2019-02-18 DIAGNOSIS — M65.312 TRIGGER THUMB, LEFT THUMB: ICD-10-CM

## 2019-02-18 DIAGNOSIS — M76.51 PATELLAR TENDINITIS, RIGHT KNEE: ICD-10-CM

## 2019-02-18 DIAGNOSIS — M17.11 UNILATERAL PRIMARY OSTEOARTHRITIS, RIGHT KNEE: ICD-10-CM

## 2019-02-18 PROCEDURE — 99203 OFFICE O/P NEW LOW 30 MIN: CPT

## 2019-02-18 PROCEDURE — 73562 X-RAY EXAM OF KNEE 3: CPT | Mod: RT

## 2019-02-19 DIAGNOSIS — N18.3 CHRONIC KIDNEY DISEASE, STAGE 3 (MODERATE): ICD-10-CM

## 2019-02-26 ENCOUNTER — OTHER (OUTPATIENT)
Age: 74
End: 2019-02-26

## 2019-02-27 LAB
ALBUMIN SERPL ELPH-MCNC: 4.1 G/DL
ALBUMIN SERPL ELPH-MCNC: 4.1 G/DL
ALP BLD-CCNC: 95 U/L
ALT SERPL-CCNC: 29 U/L
ANION GAP SERPL CALC-SCNC: 12 MMOL/L
ANION GAP SERPL CALC-SCNC: 12 MMOL/L
APPEARANCE: CLEAR
AST SERPL-CCNC: 24 U/L
BACTERIA: NEGATIVE
BASOPHILS # BLD AUTO: 0.05 K/UL
BASOPHILS NFR BLD AUTO: 0.5 %
BILIRUB DIRECT SERPL-MCNC: 0.1 MG/DL
BILIRUB INDIRECT SERPL-MCNC: 0.2 MG/DL
BILIRUB SERPL-MCNC: 0.4 MG/DL
BILIRUBIN URINE: NEGATIVE
BLOOD URINE: NEGATIVE
BUN SERPL-MCNC: 34 MG/DL
BUN SERPL-MCNC: 39 MG/DL
CALCIUM SERPL-MCNC: 10 MG/DL
CALCIUM SERPL-MCNC: 9.9 MG/DL
CHLORIDE SERPL-SCNC: 102 MMOL/L
CHLORIDE SERPL-SCNC: 105 MMOL/L
CO2 SERPL-SCNC: 25 MMOL/L
CO2 SERPL-SCNC: 28 MMOL/L
COLOR: NORMAL
CREAT SERPL-MCNC: 1.74 MG/DL
CREAT SERPL-MCNC: 2.34 MG/DL
CREAT SPEC-SCNC: 81 MG/DL
CREAT/PROT UR: 0.3 RATIO
EOSINOPHIL # BLD AUTO: 0.2 K/UL
EOSINOPHIL NFR BLD AUTO: 2.1 %
GLUCOSE QUALITATIVE U: ABNORMAL
GLUCOSE SERPL-MCNC: 112 MG/DL
GLUCOSE SERPL-MCNC: 152 MG/DL
HBA1C MFR BLD HPLC: 7.1 %
HCT VFR BLD CALC: 47.2 %
HGB BLD-MCNC: 14.4 G/DL
HYALINE CASTS: 0 /LPF
IMM GRANULOCYTES NFR BLD AUTO: 0.3 %
KETONES URINE: NEGATIVE
LEUKOCYTE ESTERASE URINE: NEGATIVE
LYMPHOCYTES # BLD AUTO: 2.44 K/UL
LYMPHOCYTES NFR BLD AUTO: 25.6 %
MAGNESIUM SERPL-MCNC: 2.4 MG/DL
MAN DIFF?: NORMAL
MCHC RBC-ENTMCNC: 30.5 GM/DL
MCHC RBC-ENTMCNC: 31.5 PG
MCV RBC AUTO: 103.3 FL
MICROSCOPIC-UA: NORMAL
MONOCYTES # BLD AUTO: 1.11 K/UL
MONOCYTES NFR BLD AUTO: 11.6 %
NEUTROPHILS # BLD AUTO: 5.7 K/UL
NEUTROPHILS NFR BLD AUTO: 59.9 %
NITRITE URINE: NEGATIVE
PH URINE: 6.5
PHOSPHATE SERPL-MCNC: 3.3 MG/DL
PLATELET # BLD AUTO: 251 K/UL
POTASSIUM SERPL-SCNC: 4.2 MMOL/L
POTASSIUM SERPL-SCNC: 4.8 MMOL/L
PROT SERPL-MCNC: 6.5 G/DL
PROT UR-MCNC: 26 MG/DL
PROTEIN URINE: NORMAL
PSA SERPL-MCNC: <0.01 NG/ML
RBC # BLD: 4.57 M/UL
RBC # FLD: 14.1 %
RED BLOOD CELLS URINE: 1 /HPF
SODIUM SERPL-SCNC: 142 MMOL/L
SODIUM SERPL-SCNC: 142 MMOL/L
SPECIFIC GRAVITY URINE: 1.02
SQUAMOUS EPITHELIAL CELLS: 0 /HPF
UROBILINOGEN URINE: NORMAL
WBC # FLD AUTO: 9.53 K/UL
WHITE BLOOD CELLS URINE: 0 /HPF

## 2019-05-07 ENCOUNTER — APPOINTMENT (OUTPATIENT)
Dept: VASCULAR SURGERY | Facility: CLINIC | Age: 74
End: 2019-05-07
Payer: COMMERCIAL

## 2019-05-07 VITALS
TEMPERATURE: 97.4 F | OXYGEN SATURATION: 96 % | WEIGHT: 205 LBS | HEIGHT: 65 IN | DIASTOLIC BLOOD PRESSURE: 64 MMHG | HEART RATE: 73 BPM | SYSTOLIC BLOOD PRESSURE: 106 MMHG | BODY MASS INDEX: 34.16 KG/M2

## 2019-05-07 PROCEDURE — 93971 EXTREMITY STUDY: CPT

## 2019-05-07 PROCEDURE — 99213 OFFICE O/P EST LOW 20 MIN: CPT

## 2019-05-21 ENCOUNTER — APPOINTMENT (OUTPATIENT)
Dept: VASCULAR SURGERY | Facility: CLINIC | Age: 74
End: 2019-05-21
Payer: COMMERCIAL

## 2019-05-21 VITALS — DIASTOLIC BLOOD PRESSURE: 65 MMHG | SYSTOLIC BLOOD PRESSURE: 117 MMHG

## 2019-05-21 VITALS
HEART RATE: 69 BPM | DIASTOLIC BLOOD PRESSURE: 62 MMHG | OXYGEN SATURATION: 98 % | BODY MASS INDEX: 34.16 KG/M2 | SYSTOLIC BLOOD PRESSURE: 109 MMHG | WEIGHT: 205 LBS | HEIGHT: 65 IN | TEMPERATURE: 97.8 F

## 2019-05-21 PROCEDURE — 99213 OFFICE O/P EST LOW 20 MIN: CPT

## 2019-05-21 RX ORDER — AMMONIUM LACTATE 12 %
12 CREAM (GRAM) TOPICAL TWICE DAILY
Qty: 280 | Refills: 6 | Status: ACTIVE | COMMUNITY
Start: 2018-05-02 | End: 1900-01-01

## 2019-06-18 ENCOUNTER — APPOINTMENT (OUTPATIENT)
Dept: VASCULAR SURGERY | Facility: CLINIC | Age: 74
End: 2019-06-18
Payer: COMMERCIAL

## 2019-06-18 VITALS
HEART RATE: 70 BPM | BODY MASS INDEX: 33.75 KG/M2 | HEIGHT: 65 IN | OXYGEN SATURATION: 94 % | SYSTOLIC BLOOD PRESSURE: 135 MMHG | TEMPERATURE: 97.3 F | WEIGHT: 202.56 LBS | DIASTOLIC BLOOD PRESSURE: 81 MMHG

## 2019-06-18 DIAGNOSIS — I83.12 VARICOSE VEINS OF RIGHT LOWER EXTREMITY WITH INFLAMMATION: ICD-10-CM

## 2019-06-18 DIAGNOSIS — I83.11 VARICOSE VEINS OF RIGHT LOWER EXTREMITY WITH INFLAMMATION: ICD-10-CM

## 2019-06-18 PROCEDURE — 99213 OFFICE O/P EST LOW 20 MIN: CPT

## 2019-06-20 ENCOUNTER — APPOINTMENT (OUTPATIENT)
Dept: NEPHROLOGY | Facility: CLINIC | Age: 74
End: 2019-06-20
Payer: COMMERCIAL

## 2019-06-20 VITALS
HEIGHT: 65 IN | DIASTOLIC BLOOD PRESSURE: 78 MMHG | BODY MASS INDEX: 33.61 KG/M2 | OXYGEN SATURATION: 96 % | WEIGHT: 201.72 LBS | HEART RATE: 79 BPM | SYSTOLIC BLOOD PRESSURE: 118 MMHG

## 2019-06-20 PROCEDURE — 99214 OFFICE O/P EST MOD 30 MIN: CPT

## 2019-06-20 NOTE — PHYSICAL EXAM
[General Appearance - Alert] : alert [General Appearance - In No Acute Distress] : in no acute distress [General Appearance - Well Developed] : well developed [Oropharynx] : the oropharynx was normal [Neck Appearance] : the appearance of the neck was normal [Auscultation Breath Sounds / Voice Sounds] : lungs were clear to auscultation bilaterally [Jugular Venous Distention Increased] : there was no jugular-venous distention [Heart Sounds Pericardial Friction Rub] : no pericardial rub [Heart Sounds Gallop] : no gallops [Edema] : there was no peripheral edema [Arterial Pulses Femoral] : femoral pulses were normal without bruits [Abdomen Tenderness] : non-tender [Abdomen Soft] : soft [Bowel Sounds] : normal bowel sounds [Cervical Lymph Nodes Enlarged Posterior Bilaterally] : posterior cervical [Supraclavicular Lymph Nodes Enlarged Bilaterally] : supraclavicular [No CVA Tenderness] : no ~M costovertebral angle tenderness [] : no rash [Nail Clubbing] : no clubbing  or cyanosis of the fingernails [Abnormal Walk] : normal gait [Oriented To Time, Place, And Person] : oriented to person, place, and time [FreeTextEntry1] : Responsive

## 2019-06-20 NOTE — ASSESSMENT
[FreeTextEntry1] : Vini is a 70-year-old diabetic gentleman who previously had been obese \par His blood pressure was well controlled and there was no edema. His renal function has been stable for 3 years although he had slightly more protein in the urine last week than usual. Overall, I thought he was doing quite well.\par \par Legs feels better\par SCr last week up to 2.04\par Good urination\par no NSAIDS\par Check labs- UA

## 2019-06-20 NOTE — HISTORY OF PRESENT ILLNESS
[FreeTextEntry1] : Feels well\par DM Nephropathy\par Gaining weight\par \par Legs feels better\par SCr last week up to 2.04\par Good urination\par no NSAIDS

## 2019-06-20 NOTE — REVIEW OF SYSTEMS
[Feeling Tired] : not feeling tired [Feeling Poorly] : not feeling poorly [Eyesight Problems] : no eyesight problems [Lower Ext Edema] : no extremity edema [Nosebleeds] : no nosebleeds [PND] : no PND [Constipation] : no constipation [Orthopnea] : no orthopnea [FreeTextEntry2] : weight stable [Diarrhea] : no diarrhea [FreeTextEntry5] : just passed a stress test [FreeTextEntry8] : occasional frequency

## 2019-06-21 LAB
ALBUMIN SERPL ELPH-MCNC: 4.2 G/DL
ALP BLD-CCNC: 107 U/L
ALT SERPL-CCNC: 28 U/L
ANION GAP SERPL CALC-SCNC: 14 MMOL/L
APPEARANCE: CLEAR
AST SERPL-CCNC: 22 U/L
BACTERIA: NEGATIVE
BILIRUB SERPL-MCNC: 0.6 MG/DL
BILIRUBIN URINE: NEGATIVE
BLOOD URINE: NEGATIVE
BUN SERPL-MCNC: 38 MG/DL
CALCIUM SERPL-MCNC: 10.1 MG/DL
CHLORIDE SERPL-SCNC: 104 MMOL/L
CO2 SERPL-SCNC: 24 MMOL/L
COLOR: NORMAL
CREAT SERPL-MCNC: 1.9 MG/DL
CREAT SPEC-SCNC: 56 MG/DL
CREAT/PROT UR: 0.4 RATIO
GLUCOSE QUALITATIVE U: ABNORMAL
GLUCOSE SERPL-MCNC: 99 MG/DL
HYALINE CASTS: 0 /LPF
KETONES URINE: NEGATIVE
LEUKOCYTE ESTERASE URINE: NEGATIVE
MICROSCOPIC-UA: NORMAL
NITRITE URINE: NEGATIVE
PH URINE: 6
POTASSIUM SERPL-SCNC: 4.4 MMOL/L
PROT SERPL-MCNC: 6.8 G/DL
PROT UR-MCNC: 20 MG/DL
PROTEIN URINE: NORMAL
RED BLOOD CELLS URINE: 2 /HPF
SODIUM SERPL-SCNC: 142 MMOL/L
SPECIFIC GRAVITY URINE: 1.01
SQUAMOUS EPITHELIAL CELLS: 0 /HPF
UROBILINOGEN URINE: NORMAL
WHITE BLOOD CELLS URINE: 0 /HPF

## 2019-07-23 ENCOUNTER — APPOINTMENT (OUTPATIENT)
Dept: VASCULAR SURGERY | Facility: CLINIC | Age: 74
End: 2019-07-23

## 2019-09-19 ENCOUNTER — APPOINTMENT (OUTPATIENT)
Dept: NEPHROLOGY | Facility: CLINIC | Age: 74
End: 2019-09-19
Payer: COMMERCIAL

## 2019-09-19 VITALS
OXYGEN SATURATION: 98 % | BODY MASS INDEX: 34.53 KG/M2 | SYSTOLIC BLOOD PRESSURE: 111 MMHG | DIASTOLIC BLOOD PRESSURE: 63 MMHG | WEIGHT: 207.23 LBS | HEART RATE: 77 BPM | HEIGHT: 65 IN

## 2019-09-19 PROCEDURE — 99214 OFFICE O/P EST MOD 30 MIN: CPT

## 2019-09-19 NOTE — REVIEW OF SYSTEMS
[Feeling Poorly] : not feeling poorly [Feeling Tired] : not feeling tired [Eyesight Problems] : no eyesight problems [Nosebleeds] : no nosebleeds [Lower Ext Edema] : no extremity edema [Orthopnea] : no orthopnea [PND] : no PND [Constipation] : no constipation [Diarrhea] : no diarrhea [FreeTextEntry2] : weight stable [FreeTextEntry5] : just passed a stress test [FreeTextEntry8] : occasional frequency

## 2019-09-19 NOTE — ASSESSMENT
[FreeTextEntry1] : Vini is a 70-year-old diabetic gentleman who previously had been obese \par His blood pressure was well controlled and there was no edema. His renal function has been stable for 3 years although he had slightly more protein in the urine last week than usual. Overall, I thought he was doing quite well.\par \par Feels well\par SCr slowly rising\par BP excllent\par HbA1c improving\par but, abd fat!

## 2019-09-19 NOTE — PHYSICAL EXAM
[General Appearance - Alert] : alert [General Appearance - In No Acute Distress] : in no acute distress [General Appearance - Well Developed] : well developed [Oropharynx] : the oropharynx was normal [Neck Appearance] : the appearance of the neck was normal [Jugular Venous Distention Increased] : there was no jugular-venous distention [Auscultation Breath Sounds / Voice Sounds] : lungs were clear to auscultation bilaterally [Heart Sounds Gallop] : no gallops [Heart Sounds Pericardial Friction Rub] : no pericardial rub [Arterial Pulses Femoral] : femoral pulses were normal without bruits [Edema] : there was no peripheral edema [Bowel Sounds] : normal bowel sounds [Abdomen Soft] : soft [Abdomen Tenderness] : non-tender [Cervical Lymph Nodes Enlarged Posterior Bilaterally] : posterior cervical [Supraclavicular Lymph Nodes Enlarged Bilaterally] : supraclavicular [No CVA Tenderness] : no ~M costovertebral angle tenderness [Abnormal Walk] : normal gait [Nail Clubbing] : no clubbing  or cyanosis of the fingernails [] : no rash [FreeTextEntry1] : Responsive [Oriented To Time, Place, And Person] : oriented to person, place, and time

## 2019-09-19 NOTE — HISTORY OF PRESENT ILLNESS
[FreeTextEntry1] : Feels well\par DM Nephropathy\par Gaining weight\par \par Legs feels better\par SCr slowly rising\par feels well

## 2019-09-24 LAB
25(OH)D3 SERPL-MCNC: 23.1 NG/ML
ALBUMIN SERPL ELPH-MCNC: 4.3 G/DL
ALP BLD-CCNC: 122 U/L
ALT SERPL-CCNC: 29 U/L
ANION GAP SERPL CALC-SCNC: 14 MMOL/L
AST SERPL-CCNC: 22 U/L
BASOPHILS # BLD AUTO: 0.06 K/UL
BASOPHILS NFR BLD AUTO: 0.6 %
BILIRUB SERPL-MCNC: 0.7 MG/DL
BUN SERPL-MCNC: 45 MG/DL
CALCIUM SERPL-MCNC: 10.1 MG/DL
CHLORIDE SERPL-SCNC: 104 MMOL/L
CHOLEST SERPL-MCNC: 112 MG/DL
CHOLEST/HDLC SERPL: 2.3 RATIO
CO2 SERPL-SCNC: 24 MMOL/L
CREAT SERPL-MCNC: 2.04 MG/DL
EOSINOPHIL # BLD AUTO: 0.25 K/UL
EOSINOPHIL NFR BLD AUTO: 2.5 %
ESTIMATED AVERAGE GLUCOSE: 154 MG/DL
GLUCOSE SERPL-MCNC: 107 MG/DL
HBA1C MFR BLD HPLC: 7 %
HCT VFR BLD CALC: 50.6 %
HDLC SERPL-MCNC: 48 MG/DL
HGB BLD-MCNC: 15.9 G/DL
IMM GRANULOCYTES NFR BLD AUTO: 0.4 %
LDLC SERPL CALC-MCNC: 38 MG/DL
LYMPHOCYTES # BLD AUTO: 2.5 K/UL
LYMPHOCYTES NFR BLD AUTO: 24.6 %
MAN DIFF?: NORMAL
MCHC RBC-ENTMCNC: 31.3 PG
MCHC RBC-ENTMCNC: 31.4 GM/DL
MCV RBC AUTO: 99.6 FL
MONOCYTES # BLD AUTO: 1.09 K/UL
MONOCYTES NFR BLD AUTO: 10.7 %
NEUTROPHILS # BLD AUTO: 6.24 K/UL
NEUTROPHILS NFR BLD AUTO: 61.2 %
PLATELET # BLD AUTO: 262 K/UL
POTASSIUM SERPL-SCNC: 4.6 MMOL/L
PROT SERPL-MCNC: 7 G/DL
PSA FREE FLD-MCNC: NORMAL %
PSA FREE SERPL-MCNC: <0.01 NG/ML
PSA SERPL-MCNC: <0.01 NG/ML
RBC # BLD: 5.08 M/UL
RBC # FLD: 13.6 %
SODIUM SERPL-SCNC: 142 MMOL/L
TRIGL SERPL-MCNC: 129 MG/DL
WBC # FLD AUTO: 10.18 K/UL

## 2019-12-19 ENCOUNTER — APPOINTMENT (OUTPATIENT)
Dept: NEPHROLOGY | Facility: CLINIC | Age: 74
End: 2019-12-19
Payer: COMMERCIAL

## 2019-12-19 VITALS
SYSTOLIC BLOOD PRESSURE: 122 MMHG | DIASTOLIC BLOOD PRESSURE: 72 MMHG | HEIGHT: 65 IN | HEART RATE: 71 BPM | WEIGHT: 198.41 LBS | BODY MASS INDEX: 33.06 KG/M2 | OXYGEN SATURATION: 98 %

## 2019-12-19 PROCEDURE — 99213 OFFICE O/P EST LOW 20 MIN: CPT

## 2019-12-19 RX ORDER — RANITIDINE 300 MG/1
300 TABLET ORAL
Qty: 90 | Refills: 0 | Status: DISCONTINUED | COMMUNITY
Start: 2017-02-23 | End: 2019-12-19

## 2019-12-19 RX ORDER — FAMOTIDINE 40 MG/1
40 TABLET, FILM COATED ORAL
Qty: 30 | Refills: 3 | Status: ACTIVE | COMMUNITY
Start: 2019-12-19

## 2019-12-19 NOTE — ASSESSMENT
[FreeTextEntry1] : Vini is a 70-year-old diabetic gentleman who previously had been obese \par His blood pressure was well controlled and there was no edema. His renal function has been stable for 3 years although he had slightly more protein in the urine last week than usual. Overall, I thought he was doing quite well.\par \par Feels well\par SCr slowly rising\par BP excllent\par HbA1c improving\par has lost a little weight\par Scr slow rise

## 2019-12-19 NOTE — PHYSICAL EXAM
[General Appearance - Well Developed] : well developed [General Appearance - In No Acute Distress] : in no acute distress [General Appearance - Alert] : alert [Neck Appearance] : the appearance of the neck was normal [Oropharynx] : the oropharynx was normal [Jugular Venous Distention Increased] : there was no jugular-venous distention [Heart Sounds Gallop] : no gallops [Auscultation Breath Sounds / Voice Sounds] : lungs were clear to auscultation bilaterally [Heart Sounds Pericardial Friction Rub] : no pericardial rub [Arterial Pulses Femoral] : femoral pulses were normal without bruits [Edema] : there was no peripheral edema [Bowel Sounds] : normal bowel sounds [Abdomen Soft] : soft [Abdomen Tenderness] : non-tender [Cervical Lymph Nodes Enlarged Posterior Bilaterally] : posterior cervical [Supraclavicular Lymph Nodes Enlarged Bilaterally] : supraclavicular [No CVA Tenderness] : no ~M costovertebral angle tenderness [Abnormal Walk] : normal gait [] : no rash [Nail Clubbing] : no clubbing  or cyanosis of the fingernails [FreeTextEntry1] : Responsive [Oriented To Time, Place, And Person] : oriented to person, place, and time

## 2019-12-19 NOTE — HISTORY OF PRESENT ILLNESS
[FreeTextEntry1] : Feels well\par DM Nephropathy\par Gaining weight\par \par Legs feels better\par SCr slowly rising\par feels well\par No co

## 2019-12-23 LAB
25(OH)D3 SERPL-MCNC: 25.8 NG/ML
ALBUMIN SERPL ELPH-MCNC: 4.4 G/DL
ALP BLD-CCNC: 100 U/L
ALT SERPL-CCNC: 25 U/L
ANION GAP SERPL CALC-SCNC: 15 MMOL/L
APPEARANCE: CLEAR
AST SERPL-CCNC: 23 U/L
BACTERIA: NEGATIVE
BASOPHILS # BLD AUTO: 0.04 K/UL
BASOPHILS NFR BLD AUTO: 0.4 %
BILIRUB SERPL-MCNC: 0.5 MG/DL
BILIRUBIN URINE: NEGATIVE
BLOOD URINE: NEGATIVE
BUN SERPL-MCNC: 34 MG/DL
CALCIUM SERPL-MCNC: 10.3 MG/DL
CHLORIDE SERPL-SCNC: 104 MMOL/L
CHOLEST SERPL-MCNC: 126 MG/DL
CHOLEST/HDLC SERPL: 2.1 RATIO
CO2 SERPL-SCNC: 23 MMOL/L
COLOR: COLORLESS
CREAT SERPL-MCNC: 1.83 MG/DL
CREAT SPEC-SCNC: 18 MG/DL
CREAT/PROT UR: 0.5 RATIO
EOSINOPHIL # BLD AUTO: 0.3 K/UL
EOSINOPHIL NFR BLD AUTO: 3.2 %
ESTIMATED AVERAGE GLUCOSE: 154 MG/DL
GLUCOSE QUALITATIVE U: ABNORMAL
GLUCOSE SERPL-MCNC: 100 MG/DL
HBA1C MFR BLD HPLC: 7 %
HCT VFR BLD CALC: 47.3 %
HDLC SERPL-MCNC: 60 MG/DL
HGB BLD-MCNC: 15.3 G/DL
HYALINE CASTS: 0 /LPF
IMM GRANULOCYTES NFR BLD AUTO: 0.4 %
KETONES URINE: NEGATIVE
LDLC SERPL CALC-MCNC: 47 MG/DL
LEUKOCYTE ESTERASE URINE: NEGATIVE
LYMPHOCYTES # BLD AUTO: 2.37 K/UL
LYMPHOCYTES NFR BLD AUTO: 25.6 %
MAN DIFF?: NORMAL
MCHC RBC-ENTMCNC: 31.9 PG
MCHC RBC-ENTMCNC: 32.3 GM/DL
MCV RBC AUTO: 98.7 FL
MICROSCOPIC-UA: NORMAL
MONOCYTES # BLD AUTO: 0.89 K/UL
MONOCYTES NFR BLD AUTO: 9.6 %
NEUTROPHILS # BLD AUTO: 5.63 K/UL
NEUTROPHILS NFR BLD AUTO: 60.8 %
NITRITE URINE: NEGATIVE
PH URINE: 6
PLATELET # BLD AUTO: 249 K/UL
POTASSIUM SERPL-SCNC: 4.6 MMOL/L
PROT SERPL-MCNC: 6.8 G/DL
PROT UR-MCNC: 8 MG/DL
PROTEIN URINE: NEGATIVE
RBC # BLD: 4.79 M/UL
RBC # FLD: 14.2 %
RED BLOOD CELLS URINE: 0 /HPF
SODIUM SERPL-SCNC: 142 MMOL/L
SPECIFIC GRAVITY URINE: 1.01
SQUAMOUS EPITHELIAL CELLS: 0 /HPF
TRIGL SERPL-MCNC: 94 MG/DL
UROBILINOGEN URINE: NORMAL
WBC # FLD AUTO: 9.27 K/UL
WHITE BLOOD CELLS URINE: 0 /HPF

## 2020-03-19 ENCOUNTER — APPOINTMENT (OUTPATIENT)
Dept: NEPHROLOGY | Facility: CLINIC | Age: 75
End: 2020-03-19

## 2020-07-02 ENCOUNTER — APPOINTMENT (OUTPATIENT)
Dept: NEPHROLOGY | Facility: CLINIC | Age: 75
End: 2020-07-02
Payer: MEDICARE

## 2020-07-02 VITALS — HEART RATE: 70 BPM | SYSTOLIC BLOOD PRESSURE: 136 MMHG | DIASTOLIC BLOOD PRESSURE: 64 MMHG

## 2020-07-02 VITALS — BODY MASS INDEX: 32.33 KG/M2 | WEIGHT: 206 LBS | HEIGHT: 67 IN

## 2020-07-02 PROCEDURE — 99214 OFFICE O/P EST MOD 30 MIN: CPT

## 2020-07-02 NOTE — HISTORY OF PRESENT ILLNESS
[FreeTextEntry1] : Feels well\par DM Nephropathy\par Gaining weight\par \par Legs feels better\par SCr slowly rising\par staying in away from COVID\par feels well\par gaining weight

## 2020-07-02 NOTE — REVIEW OF SYSTEMS
[Feeling Poorly] : not feeling poorly [Feeling Tired] : not feeling tired [Eyesight Problems] : no eyesight problems [Nosebleeds] : no nosebleeds [Orthopnea] : no orthopnea [Lower Ext Edema] : no extremity edema [Constipation] : no constipation [PND] : no PND [Diarrhea] : no diarrhea [FreeTextEntry2] : weight stable [FreeTextEntry5] : just passed a stress test [FreeTextEntry8] : occasional frequency

## 2020-07-02 NOTE — ASSESSMENT
[FreeTextEntry1] : Vini is a 70-year-old diabetic gentleman who previously had been obese \par His blood pressure was well controlled and there was no edema. His renal function has been stable for 3 years although he had slightly more protein in the urine last week than usual. Overall, I thought he was doing quite well.\par \par staying in away from COVID\par feels well\par gaining weight

## 2020-07-06 LAB
25(OH)D3 SERPL-MCNC: 36.2 NG/ML
ALBUMIN SERPL ELPH-MCNC: 4.6 G/DL
ALP BLD-CCNC: 120 U/L
ALT SERPL-CCNC: 32 U/L
ANION GAP SERPL CALC-SCNC: 17 MMOL/L
APPEARANCE: CLEAR
AST SERPL-CCNC: 26 U/L
BACTERIA: NEGATIVE
BASOPHILS # BLD AUTO: 0.04 K/UL
BASOPHILS NFR BLD AUTO: 0.4 %
BILIRUB SERPL-MCNC: 0.5 MG/DL
BILIRUBIN URINE: NEGATIVE
BLOOD URINE: NEGATIVE
BUN SERPL-MCNC: 51 MG/DL
CALCIUM SERPL-MCNC: 10.1 MG/DL
CHLORIDE SERPL-SCNC: 103 MMOL/L
CHOLEST SERPL-MCNC: 121 MG/DL
CHOLEST/HDLC SERPL: 2 RATIO
CO2 SERPL-SCNC: 21 MMOL/L
COLOR: COLORLESS
CREAT SERPL-MCNC: 2.21 MG/DL
CREAT SPEC-SCNC: 14 MG/DL
CREAT/PROT UR: 0.7 RATIO
EOSINOPHIL # BLD AUTO: 0.14 K/UL
EOSINOPHIL NFR BLD AUTO: 1.4 %
ESTIMATED AVERAGE GLUCOSE: 154 MG/DL
GLUCOSE QUALITATIVE U: ABNORMAL
GLUCOSE SERPL-MCNC: 160 MG/DL
HBA1C MFR BLD HPLC: 7 %
HCT VFR BLD CALC: 48.4 %
HDLC SERPL-MCNC: 61 MG/DL
HGB BLD-MCNC: 15.8 G/DL
HYALINE CASTS: 0 /LPF
IMM GRANULOCYTES NFR BLD AUTO: 0.5 %
KETONES URINE: NEGATIVE
LDLC SERPL CALC-MCNC: 49 MG/DL
LEUKOCYTE ESTERASE URINE: NEGATIVE
LYMPHOCYTES # BLD AUTO: 2.15 K/UL
LYMPHOCYTES NFR BLD AUTO: 22.3 %
MAN DIFF?: NORMAL
MCHC RBC-ENTMCNC: 32.6 GM/DL
MCHC RBC-ENTMCNC: 32.6 PG
MCV RBC AUTO: 100 FL
MICROSCOPIC-UA: NORMAL
MONOCYTES # BLD AUTO: 0.94 K/UL
MONOCYTES NFR BLD AUTO: 9.7 %
NEUTROPHILS # BLD AUTO: 6.34 K/UL
NEUTROPHILS NFR BLD AUTO: 65.7 %
NITRITE URINE: NEGATIVE
PH URINE: 6.5
PLATELET # BLD AUTO: 256 K/UL
POTASSIUM SERPL-SCNC: 5.1 MMOL/L
PROT SERPL-MCNC: 7 G/DL
PROT UR-MCNC: 9 MG/DL
PROTEIN URINE: NEGATIVE
RBC # BLD: 4.84 M/UL
RBC # FLD: 14.2 %
RED BLOOD CELLS URINE: 0 /HPF
SARS-COV-2 IGG SERPL IA-ACNC: 0.01 INDEX
SARS-COV-2 IGG SERPL QL IA: NEGATIVE
SODIUM SERPL-SCNC: 140 MMOL/L
SPECIFIC GRAVITY URINE: 1.01
SQUAMOUS EPITHELIAL CELLS: 0 /HPF
T4 SERPL-MCNC: 5.5 UG/DL
TRIGL SERPL-MCNC: 57 MG/DL
TSH SERPL-ACNC: 1.11 UIU/ML
URATE SERPL-MCNC: 6.8 MG/DL
UROBILINOGEN URINE: NORMAL
WBC # FLD AUTO: 9.66 K/UL
WHITE BLOOD CELLS URINE: 0 /HPF

## 2020-07-29 ENCOUNTER — APPOINTMENT (OUTPATIENT)
Dept: ORTHOPEDIC SURGERY | Facility: CLINIC | Age: 75
End: 2020-07-29
Payer: MEDICARE

## 2020-07-29 DIAGNOSIS — S29.011A STRAIN OF MUSCLE AND TENDON OF FRONT WALL OF THORAX, INITIAL ENCOUNTER: ICD-10-CM

## 2020-07-29 DIAGNOSIS — S20.211A CONTUSION OF RIGHT FRONT WALL OF THORAX, INITIAL ENCOUNTER: ICD-10-CM

## 2020-07-29 PROCEDURE — 99214 OFFICE O/P EST MOD 30 MIN: CPT

## 2020-07-29 PROCEDURE — 71100 X-RAY EXAM RIBS UNI 2 VIEWS: CPT | Mod: RT

## 2020-07-29 NOTE — HISTORY OF PRESENT ILLNESS
[de-identified] : 74 year old male presents complaining of right rib pain after falling off of a chair yesterday. He fell asleep and slipped off the chair. He landed on the floor. He did not feel a snap or a pop. He notes pain on his right side that is exacerbated by coughing and sneezing. He also has discomfort when taking a deep breath. He has pain when sitting, and when standing from a seated position. He denies any recent lifting or carrying.

## 2020-07-29 NOTE — ADDENDUM
[FreeTextEntry1] : I, Sacha Patino, acted solely as a scribe for Dr. Ney Pascal on this date 07/29/2020.\par All medical record entries made by the Scribe were at my, Dr. Ney Pascal, direction and personally dictated by me on 07/29/2020. I have reviewed the chart and agree that the record accurately reflects my personal performance of the history, physical exam, assessment and plan. I have also personally directed, reviewed, and agreed with the chart.

## 2020-07-29 NOTE — PHYSICAL EXAM
[de-identified] : Constitutional\par o Appearance : well-nourished, well developed, alert, in no acute distress \par Head and Face\par o Head :\par ¦ Inspection : atraumatic, normocephalic\par o Face :\par ¦ Inspection : no visible rash or discoloration\par Respiratory\par o Respiratory Effort: breathing unlabored \par Neurologic\par o Coordination : finger-to-nose-to-finger testing normal bilaterally, heel-shin cerebellar test within normal limits bilaterally \par Psychiatric\par o Mood and Affect: mood normal, affect appropriate \par Cardiovascular\par o Observation/Palpation : - no swelling, \par Lymphatic\par o Additional Nodes : No palpable lymph nodes present \par \par Ribs\par o Inspection/Palpation : tenderness over the inferior 9th rib cartilage, ecchymosis over the 5th rib, no pain with compression, no pain with deep breath, no pain with forward flexion of the shoulder\par o Stability : no rib instability present on palpation\par Right shoulder: Full painless range of motion strength 5/5\par Radiology Results \par o Right Ribs : A/P and lateral views were obtained and revealed diffuse sternal wires, no evident fractures

## 2020-07-29 NOTE — DISCUSSION/SUMMARY
[de-identified] : I discussed the underlying pathophysiology of the patient's condition in great detail with the patient. I went over the patient's x-rays with them in great detail. We discussed the use of ice, Tylenol and anti-inflammatories to relieve pain. He needs to avoid twisting his torso and pushing off when standing up. He should avoid any heavy lifting, carrying, or overhead activities. If his pain does not improve in 10-14 days then he should come back for new x-rays. All of his questions were answered. He understands and consents to the plan. \par \par FU PRN.

## 2020-09-16 ENCOUNTER — APPOINTMENT (OUTPATIENT)
Dept: ORTHOPEDIC SURGERY | Facility: CLINIC | Age: 75
End: 2020-09-16

## 2020-10-16 ENCOUNTER — EMERGENCY (EMERGENCY)
Facility: HOSPITAL | Age: 75
LOS: 1 days | Discharge: HOME | End: 2020-10-16
Attending: STUDENT IN AN ORGANIZED HEALTH CARE EDUCATION/TRAINING PROGRAM
Payer: MEDICARE

## 2020-10-16 VITALS
SYSTOLIC BLOOD PRESSURE: 186 MMHG | TEMPERATURE: 98 F | HEIGHT: 66 IN | OXYGEN SATURATION: 98 % | HEART RATE: 68 BPM | DIASTOLIC BLOOD PRESSURE: 85 MMHG | RESPIRATION RATE: 18 BRPM | WEIGHT: 205.03 LBS

## 2020-10-16 PROCEDURE — 70450 CT HEAD/BRAIN W/O DYE: CPT

## 2020-10-16 PROCEDURE — 90715 TDAP VACCINE 7 YRS/> IM: CPT

## 2020-10-16 PROCEDURE — 99284 EMERGENCY DEPT VISIT MOD MDM: CPT

## 2020-10-16 PROCEDURE — 99284 EMERGENCY DEPT VISIT MOD MDM: CPT | Mod: 25

## 2020-10-16 PROCEDURE — 70450 CT HEAD/BRAIN W/O DYE: CPT | Mod: 26

## 2020-10-16 PROCEDURE — 90471 IMMUNIZATION ADMIN: CPT

## 2020-10-16 RX ORDER — TETANUS TOXOID, REDUCED DIPHTHERIA TOXOID AND ACELLULAR PERTUSSIS VACCINE, ADSORBED 5; 2.5; 8; 8; 2.5 [IU]/.5ML; [IU]/.5ML; UG/.5ML; UG/.5ML; UG/.5ML
0.5 SUSPENSION INTRAMUSCULAR ONCE
Refills: 0 | Status: COMPLETED | OUTPATIENT
Start: 2020-10-16 | End: 2020-10-16

## 2020-10-16 RX ADMIN — TETANUS TOXOID, REDUCED DIPHTHERIA TOXOID AND ACELLULAR PERTUSSIS VACCINE, ADSORBED 0.5 MILLILITER(S): 5; 2.5; 8; 8; 2.5 SUSPENSION INTRAMUSCULAR at 16:59

## 2020-10-16 NOTE — ED ADULT NURSE NOTE - NSIMPLEMENTINTERV_GEN_ALL_ED
Implemented All Fall Risk Interventions:  [place] to call system. Call bell, personal items and telephone within reach. Instruct patient to call for assistance. Room bathroom lighting operational. Non-slip footwear when patient is off stretcher. Physically safe environment: no spills, clutter or unnecessary equipment. Stretcher in lowest position, wheels locked, appropriate side rails in place. Provide visual cue, wrist band, yellow gown, etc. Monitor gait and stability. Monitor for mental status changes and reorient to person, place, and time. Review medications for side effects contributing to fall risk. Reinforce activity limits and safety measures with patient and family.

## 2020-10-16 NOTE — ED ADULT TRIAGE NOTE - CHIEF COMPLAINT QUOTE
fell asleep at kitchen table on Wednesday night, fell off chair and hit head on kitchen floor. on aspirin.

## 2020-10-16 NOTE — ED ADULT TRIAGE NOTE - ESI TRIAGE ACUITY LEVEL, MLM
Dx: L UE 2

## 2020-10-16 NOTE — ED PROVIDER NOTE - PATIENT PORTAL LINK FT
You can access the FollowMyHealth Patient Portal offered by [facility] by registering at the following website: [email or web address]. By joining [employer]’s FollowMyHealth portal, you will also be able to view your health information using other applications (apps) compatible with our system.

## 2020-10-16 NOTE — ED PROVIDER NOTE - NS ED ROS FT
Review of Systems    Constitutional: (-) fever, (-) chills, (-) fatigue  HEENT: (-) sore throat, (-) hearing loss, (-) nasal congestion  Cardiovascular: (-) chest pain, (-) syncope  Respiratory: (-) cough, (-) shortness of breath  Gastrointestinal: (-) vomiting, (-) diarrhea, (-) abdominal pain  Musculoskeletal: (-) neck pain, (-) back pain, (-) joint pain  Integumentary: (-) rash, (-) edema, (+) wound  Neurological: (-) headache, (-) altered mental status    Except as documented in the HPI, all other systems are negative.

## 2020-10-16 NOTE — ED PROVIDER NOTE - NSFOLLOWUPINSTRUCTIONS_ED_ALL_ED_FT
You were seen today after a head injury. You had a CT scan performed which showed no acute findings.    Follow up with your primary care physician in 1-3 days.    Take over the counter acetaminophen (Tylenol) 650-1000 mg every 4-6 hours as needed for pain. Do not take more than 3000 mg in a 24 hour period. Be aware many over the counter and prescription medications also contain acetaminophen (Tylenol).     Return immediately with any new or worsening symptoms including severe headache, vomiting, vision change, difficulty speaking, numbness, weakness, chest pain, shortness of breath, or any additional concerns.

## 2020-10-16 NOTE — ED PROVIDER NOTE - PMH
CAD (Coronary Artery Disease)    CKD (Chronic Kidney Disease)    Diabetes Mellitus    GERD (Gastroesophageal Reflux Disease)    HTN - Hypertension    Hyperlipidemia    Prostate Cancer

## 2020-10-16 NOTE — ED PROVIDER NOTE - ATTENDING CONTRIBUTION TO CARE
75 M w/ pmh of DM on insulin, HTN, CAD on asa, CKD presents to the ER s/p hitting his head 2 days ago while he was seated and hit the front of the head on the ground w/ knee pain. Pt is nontoxic appearing, is awake w/ brusing over the R eye w/ superficial abrasions. unknown last tetanus, pt is aaox3, he has 5/5 upper and lower extremity strength, no c/t/l spine tenderness ambulatory w/out difficulty. pt reports he is here 2/2 presenting to urgent care for a "check UP" and they stated that he should come to the ER for ct scan. PT unlikely has ICH, given well appearing presentation and lack of headache , no vomiting, pt to be d/c'd w/ outpt follow up.

## 2020-10-16 NOTE — ED PROVIDER NOTE - OBJECTIVE STATEMENT
75 year old male with PMH DM, HTN, CAD on aspirin, CKD presents with head injury 2 days ago. Pt states he was sitting at the kitchen table, fell asleep, and fell off his chair hitting the right side of his head onto the floor. Pt unsure of LOC, but states he remembers everything. Pt states he feels well, but noticed worsening bruising to right side of his head and went to urgent care to "get checked out" and was sent to ED for CT scan. Denies any headache, vision change, speech change, numbness, weakness, neck pain, chest pain, or shortness of breath. Pt unsure of last tetanus vaccine. Denies any additional complaints.

## 2020-10-16 NOTE — ED PROVIDER NOTE - CLINICAL SUMMARY MEDICAL DECISION MAKING FREE TEXT BOX
Ori-PGY2: 75 year old male with PMH DM, HTN, CAD on aspirin, CKD presents with head injury 2 days ago. No complaints at this time, states he feels well, but noticed worsening ecchymosis over the past 2 days. No headache, vision change, numbness, or weakness. Pt with superficial abrasions, no focal neuro deficits. Declined Tylenol at this time as he states he has no headache/pain. Will obtain CT head as patient is on aspirin 81 mg, update tetanus and reassess with disposition accordingly.

## 2020-10-16 NOTE — ED ADULT NURSE NOTE - HIV OFFER
Opt out Star Wedge Flap Text: The defect edges were debeveled with a #15 scalpel blade.  Given the location of the defect, shape of the defect and the proximity to free margins a star wedge flap was deemed most appropriate.  Using a sterile surgical marker, an appropriate rotation flap was drawn incorporating the defect and placing the expected incisions within the relaxed skin tension lines where possible. The area thus outlined was incised deep to adipose tissue with a #15 scalpel blade.  The skin margins were undermined to an appropriate distance in all directions utilizing iris scissors.

## 2020-11-05 ENCOUNTER — APPOINTMENT (OUTPATIENT)
Dept: NEPHROLOGY | Facility: CLINIC | Age: 75
End: 2020-11-05
Payer: MEDICARE

## 2020-11-05 VITALS
DIASTOLIC BLOOD PRESSURE: 73 MMHG | OXYGEN SATURATION: 97 % | HEART RATE: 65 BPM | WEIGHT: 217 LBS | HEIGHT: 67 IN | SYSTOLIC BLOOD PRESSURE: 127 MMHG | BODY MASS INDEX: 34.06 KG/M2

## 2020-11-05 PROCEDURE — 99072 ADDL SUPL MATRL&STAF TM PHE: CPT

## 2020-11-05 PROCEDURE — 99213 OFFICE O/P EST LOW 20 MIN: CPT | Mod: 25

## 2020-11-05 NOTE — HISTORY OF PRESENT ILLNESS
[FreeTextEntry1] : Feels well\par DM Nephropathy\par Gaining weight\par \par gaining weight on insulin\par 3 weeks ago fell from seat and had ecchymoses and bleeding r head

## 2020-11-05 NOTE — ASSESSMENT
[FreeTextEntry1] : Vini is a 70-year-old diabetic gentleman who previously had been obese \par His blood pressure was well controlled and there was no edema. His renal function has been stable for 3 years although he had slightly more protein in the urine last week than usual. Overall, I thought he was doing quite well.\par \par staying in away from COVID\par feels well\par gaining weight on insulin\par 3 weeks ago fell from seat and had ecchymoses and bleeding r head

## 2020-11-06 LAB
APPEARANCE: CLEAR
BACTERIA: NEGATIVE
BILIRUBIN URINE: NEGATIVE
BLOOD URINE: NEGATIVE
COLOR: NORMAL
CREAT SPEC-SCNC: 55 MG/DL
CREAT/PROT UR: 0.4 RATIO
GLUCOSE QUALITATIVE U: NEGATIVE
HYALINE CASTS: 0 /LPF
KETONES URINE: NEGATIVE
LEUKOCYTE ESTERASE URINE: NEGATIVE
MICROSCOPIC-UA: NORMAL
NITRITE URINE: NEGATIVE
PH URINE: 6.5
PROT UR-MCNC: 24 MG/DL
PROTEIN URINE: NORMAL
RED BLOOD CELLS URINE: 0 /HPF
SPECIFIC GRAVITY URINE: 1.01
SQUAMOUS EPITHELIAL CELLS: 0 /HPF
UROBILINOGEN URINE: NORMAL
WHITE BLOOD CELLS URINE: 0 /HPF

## 2020-12-04 ENCOUNTER — APPOINTMENT (OUTPATIENT)
Dept: ORTHOPEDIC SURGERY | Facility: CLINIC | Age: 75
End: 2020-12-04
Payer: MEDICARE

## 2020-12-04 VITALS
DIASTOLIC BLOOD PRESSURE: 66 MMHG | HEART RATE: 76 BPM | BODY MASS INDEX: 34.37 KG/M2 | HEIGHT: 67 IN | WEIGHT: 219 LBS | SYSTOLIC BLOOD PRESSURE: 127 MMHG

## 2020-12-04 DIAGNOSIS — M65.321 TRIGGER FINGER, RIGHT INDEX FINGER: ICD-10-CM

## 2020-12-04 DIAGNOSIS — M65.341 TRIGGER FINGER, RIGHT RING FINGER: ICD-10-CM

## 2020-12-04 DIAGNOSIS — M65.342 TRIGGER FINGER, LEFT RING FINGER: ICD-10-CM

## 2020-12-04 DIAGNOSIS — M65.331 TRIGGER FINGER, RIGHT MIDDLE FINGER: ICD-10-CM

## 2020-12-04 DIAGNOSIS — M65.332 TRIGGER FINGER, LEFT MIDDLE FINGER: ICD-10-CM

## 2020-12-04 DIAGNOSIS — M65.4 RADIAL STYLOID TENOSYNOVITIS [DE QUERVAIN]: ICD-10-CM

## 2020-12-04 DIAGNOSIS — M65.322 TRIGGER FINGER, LEFT INDEX FINGER: ICD-10-CM

## 2020-12-04 PROCEDURE — 73130 X-RAY EXAM OF HAND: CPT | Mod: LT

## 2020-12-04 PROCEDURE — 99214 OFFICE O/P EST MOD 30 MIN: CPT

## 2020-12-04 PROCEDURE — 99072 ADDL SUPL MATRL&STAF TM PHE: CPT

## 2021-02-18 ENCOUNTER — APPOINTMENT (OUTPATIENT)
Dept: NEPHROLOGY | Facility: CLINIC | Age: 76
End: 2021-02-18

## 2021-03-25 ENCOUNTER — APPOINTMENT (OUTPATIENT)
Dept: NEPHROLOGY | Facility: CLINIC | Age: 76
End: 2021-03-25
Payer: MEDICARE

## 2021-03-25 VITALS
DIASTOLIC BLOOD PRESSURE: 67 MMHG | TEMPERATURE: 97.6 F | HEIGHT: 67 IN | SYSTOLIC BLOOD PRESSURE: 128 MMHG | OXYGEN SATURATION: 97 % | WEIGHT: 220.46 LBS | BODY MASS INDEX: 34.6 KG/M2 | HEART RATE: 63 BPM

## 2021-03-25 PROCEDURE — 99215 OFFICE O/P EST HI 40 MIN: CPT

## 2021-03-25 PROCEDURE — 99072 ADDL SUPL MATRL&STAF TM PHE: CPT

## 2021-03-25 NOTE — HISTORY OF PRESENT ILLNESS
[FreeTextEntry1] : Feels well\par DM Nephropathy\par Gaining weight\par \par Gained a lot of weight\par Feels well\par No CP No SOB\par

## 2021-03-25 NOTE — ASSESSMENT
[FreeTextEntry1] : Vini is a 70-year-old diabetic gentleman who previously had been obese \par His blood pressure was well controlled and there was no edema. His renal function has been stable for 3 years although he had slightly more protein in the urine last week than usual. Overall, I thought he was doing quite well.\par \par Gained a lot of weight\par Feels well\par No CP No SOB\par Had ACE inhibitor in about 2001- developed renal damage that he believes was permanent\par SCr has remained somewhat stable over the past 8 years, with low grade proteinuria\par Cont current meds as BP is excellent\par The total time of preparation for this visit, the visit itself and writing the note was 46 minutes\par

## 2021-07-08 ENCOUNTER — APPOINTMENT (OUTPATIENT)
Dept: NEPHROLOGY | Facility: CLINIC | Age: 76
End: 2021-07-08
Payer: MEDICARE

## 2021-07-08 VITALS
BODY MASS INDEX: 34.53 KG/M2 | TEMPERATURE: 97.3 F | HEART RATE: 68 BPM | SYSTOLIC BLOOD PRESSURE: 127 MMHG | WEIGHT: 220 LBS | OXYGEN SATURATION: 98 % | DIASTOLIC BLOOD PRESSURE: 75 MMHG | HEIGHT: 67 IN

## 2021-07-08 PROCEDURE — 99213 OFFICE O/P EST LOW 20 MIN: CPT

## 2021-07-08 PROCEDURE — 99072 ADDL SUPL MATRL&STAF TM PHE: CPT

## 2021-07-12 LAB
ALBUMIN SERPL ELPH-MCNC: 4.3 G/DL
ALP BLD-CCNC: 115 U/L
ALT SERPL-CCNC: 27 U/L
ANION GAP SERPL CALC-SCNC: 17 MMOL/L
APPEARANCE: CLEAR
AST SERPL-CCNC: 23 U/L
BACTERIA: NEGATIVE
BASOPHILS # BLD AUTO: 0.04 K/UL
BASOPHILS NFR BLD AUTO: 0.5 %
BILIRUB SERPL-MCNC: 0.6 MG/DL
BILIRUBIN URINE: NEGATIVE
BLOOD URINE: NEGATIVE
BUN SERPL-MCNC: 33 MG/DL
CALCIUM SERPL-MCNC: 9.9 MG/DL
CHLORIDE SERPL-SCNC: 102 MMOL/L
CHOLEST SERPL-MCNC: 149 MG/DL
CO2 SERPL-SCNC: 24 MMOL/L
COLOR: COLORLESS
COVID-19 SPIKE DOMAIN ANTIBODY INTERPRETATION: POSITIVE
CREAT SERPL-MCNC: 2.17 MG/DL
CREAT SPEC-SCNC: 35 MG/DL
CREAT/PROT UR: 1 RATIO
EOSINOPHIL # BLD AUTO: 0.2 K/UL
EOSINOPHIL NFR BLD AUTO: 2.3 %
ESTIMATED AVERAGE GLUCOSE: 194 MG/DL
GLUCOSE QUALITATIVE U: ABNORMAL
GLUCOSE SERPL-MCNC: 72 MG/DL
HBA1C MFR BLD HPLC: 8.4 %
HCT VFR BLD CALC: 49.6 %
HDLC SERPL-MCNC: 57 MG/DL
HGB BLD-MCNC: 15.8 G/DL
HYALINE CASTS: 0 /LPF
IMM GRANULOCYTES NFR BLD AUTO: 0.5 %
KETONES URINE: NEGATIVE
LDLC SERPL CALC-MCNC: 68 MG/DL
LEUKOCYTE ESTERASE URINE: NEGATIVE
LYMPHOCYTES # BLD AUTO: 2.27 K/UL
LYMPHOCYTES NFR BLD AUTO: 25.6 %
MAN DIFF?: NORMAL
MCHC RBC-ENTMCNC: 31.9 GM/DL
MCHC RBC-ENTMCNC: 32.2 PG
MCV RBC AUTO: 101 FL
MICROSCOPIC-UA: NORMAL
MONOCYTES # BLD AUTO: 0.93 K/UL
MONOCYTES NFR BLD AUTO: 10.5 %
NEUTROPHILS # BLD AUTO: 5.39 K/UL
NEUTROPHILS NFR BLD AUTO: 60.6 %
NITRITE URINE: NEGATIVE
NONHDLC SERPL-MCNC: 92 MG/DL
PH URINE: 6.5
PLATELET # BLD AUTO: 227 K/UL
POTASSIUM SERPL-SCNC: 4.6 MMOL/L
PROT SERPL-MCNC: 6.7 G/DL
PROT UR-MCNC: 35 MG/DL
PROTEIN URINE: ABNORMAL
PSA SERPL-MCNC: <0.01 NG/ML
RBC # BLD: 4.91 M/UL
RBC # FLD: 14.2 %
RED BLOOD CELLS URINE: 1 /HPF
SARS-COV-2 AB SERPL IA-ACNC: >250 U/ML
SODIUM SERPL-SCNC: 143 MMOL/L
SPECIFIC GRAVITY URINE: 1.01
SQUAMOUS EPITHELIAL CELLS: 0 /HPF
T4 SERPL-MCNC: 5.8 UG/DL
TRIGL SERPL-MCNC: 119 MG/DL
TSH SERPL-ACNC: 1.38 UIU/ML
URATE SERPL-MCNC: 7.4 MG/DL
UROBILINOGEN URINE: NORMAL
WBC # FLD AUTO: 8.87 K/UL
WHITE BLOOD CELLS URINE: 0 /HPF

## 2021-11-11 ENCOUNTER — APPOINTMENT (OUTPATIENT)
Dept: NEPHROLOGY | Facility: CLINIC | Age: 76
End: 2021-11-11
Payer: MEDICARE

## 2021-11-11 VITALS
SYSTOLIC BLOOD PRESSURE: 100 MMHG | HEART RATE: 76 BPM | TEMPERATURE: 97.2 F | WEIGHT: 226 LBS | OXYGEN SATURATION: 96 % | DIASTOLIC BLOOD PRESSURE: 63 MMHG | BODY MASS INDEX: 35.47 KG/M2 | HEIGHT: 67 IN

## 2021-11-11 PROCEDURE — 99215 OFFICE O/P EST HI 40 MIN: CPT

## 2021-11-11 RX ORDER — METHYLPREDNISOLONE 4 MG/1
4 TABLET ORAL
Qty: 1 | Refills: 0 | Status: DISCONTINUED | COMMUNITY
Start: 2019-05-07 | End: 2021-11-11

## 2021-11-11 RX ORDER — EMPAGLIFLOZIN 25 MG/1
25 TABLET, FILM COATED ORAL DAILY
Qty: 30 | Refills: 0 | Status: ACTIVE | COMMUNITY
Start: 2019-02-26

## 2021-11-11 RX ORDER — AZITHROMYCIN 250 MG/1
250 TABLET, FILM COATED ORAL
Qty: 6 | Refills: 0 | Status: DISCONTINUED | COMMUNITY
Start: 2017-05-05 | End: 2021-11-11

## 2021-11-11 RX ORDER — DICYCLOMINE HYDROCHLORIDE 20 MG/1
20 TABLET ORAL
Qty: 60 | Refills: 0 | Status: DISCONTINUED | COMMUNITY
Start: 2017-10-26 | End: 2021-11-11

## 2021-11-11 RX ORDER — SUCRALFATE 1 G/1
1 TABLET ORAL
Qty: 120 | Refills: 0 | Status: DISCONTINUED | COMMUNITY
Start: 2017-09-18 | End: 2021-11-11

## 2021-11-11 RX ORDER — MUPIROCIN 2 G/100G
2 CREAM TOPICAL
Refills: 0 | Status: DISCONTINUED | COMMUNITY
End: 2021-11-11

## 2021-11-11 RX ORDER — SUCRALFATE 1 G/10ML
1 SUSPENSION ORAL
Qty: 1200 | Refills: 0 | Status: DISCONTINUED | COMMUNITY
Start: 2017-02-24 | End: 2021-11-11

## 2021-11-11 RX ORDER — METFORMIN ER 500 MG 500 MG/1
500 TABLET ORAL
Qty: 360 | Refills: 0 | Status: DISCONTINUED | COMMUNITY
Start: 2017-08-11 | End: 2021-11-11

## 2021-11-11 RX ORDER — NEOMYCIN SULFATE, POLYMYXIN B SULFATE AND HYDROCORTISONE 3.5; 10000; 1 MG/ML; [IU]/ML; MG/ML
3.5-10000-1 SOLUTION AURICULAR (OTIC)
Qty: 10 | Refills: 5 | Status: DISCONTINUED | COMMUNITY
Start: 2017-02-06 | End: 2021-11-11

## 2021-11-11 NOTE — ASSESSMENT
[FreeTextEntry1] : Vini is a 70-year-old diabetic gentleman who previously had been obese \par His blood pressure was well controlled and there was no edema. His renal function has been stable for 3 years although he had slightly more protein in the urine last week than usual. Overall, I thought he was doing quite well.\par Gained a lot of weight\par 220 lbs\par Feels well\par \par Very upset w weight, kidney fxn and age- 76\par SCr dario from 2016 to 2017, then intermittent increases \par Mostly stable over past year\par Stopped allopurinol\par Took 20 minutes to clean up med list\par Checked BP orthostatics\par PT WILL TRY TO LOSE 20 lbs\par The total time of preparation for this visit, the visit itself and writing the note was 50 minutes\par \par \par

## 2021-11-11 NOTE — HISTORY OF PRESENT ILLNESS
[FreeTextEntry1] : Feels well\par DM Nephropathy\par Gaining weight\par \par Gained a lot of weight\par 220 lbs\par Feels well\par Very upset w weight, kidney fxn and age- 76\par

## 2022-02-24 ENCOUNTER — APPOINTMENT (OUTPATIENT)
Dept: NEPHROLOGY | Facility: CLINIC | Age: 77
End: 2022-02-24
Payer: MEDICARE

## 2022-02-24 PROCEDURE — 99214 OFFICE O/P EST MOD 30 MIN: CPT

## 2022-02-24 NOTE — ASSESSMENT
[FreeTextEntry1] : Vini is a 70-year-old diabetic gentleman who previously had been obese \par His blood pressure was well controlled and there was no edema. His renal function has been stable for 3 years although he had slightly more protein in the urine last week than usual. Overall, I thought he was doing quite well.\par Gained a lot of weight\par 220 lbs\par Feels well\par \par Gained a lot of weight\par 226 lbs\par Feels well\par No new problems\par Back on allopurinol\par Trying to lose weight\par SCr has been stable at 2.2\par BP acceptable\par The total time of preparation for this visit, the visit itself and writing the note was 39 minutes\par \par \par \par

## 2022-02-24 NOTE — HISTORY OF PRESENT ILLNESS
[FreeTextEntry1] : Feels well\par DM Nephropathy\par Gaining weight\par \par Gained a lot of weight\par 226 lbs\par Feels well\par No new problems\par \par

## 2022-02-25 ENCOUNTER — NON-APPOINTMENT (OUTPATIENT)
Age: 77
End: 2022-02-25

## 2022-02-25 ENCOUNTER — EMERGENCY (EMERGENCY)
Facility: HOSPITAL | Age: 77
LOS: 1 days | Discharge: HOME | End: 2022-02-25
Attending: STUDENT IN AN ORGANIZED HEALTH CARE EDUCATION/TRAINING PROGRAM
Payer: MEDICARE

## 2022-02-25 VITALS
OXYGEN SATURATION: 95 % | TEMPERATURE: 98 F | SYSTOLIC BLOOD PRESSURE: 150 MMHG | DIASTOLIC BLOOD PRESSURE: 75 MMHG | RESPIRATION RATE: 17 BRPM | HEART RATE: 66 BPM

## 2022-02-25 VITALS
RESPIRATION RATE: 18 BRPM | OXYGEN SATURATION: 98 % | WEIGHT: 225.09 LBS | SYSTOLIC BLOOD PRESSURE: 180 MMHG | DIASTOLIC BLOOD PRESSURE: 81 MMHG | TEMPERATURE: 98 F | HEIGHT: 66 IN | HEART RATE: 69 BPM

## 2022-02-25 LAB
ALBUMIN SERPL ELPH-MCNC: 4.3 G/DL
ALP BLD-CCNC: 111 U/L
ALT SERPL-CCNC: 29 U/L
ANION GAP SERPL CALC-SCNC: 12 MMOL/L — SIGNIFICANT CHANGE UP (ref 5–17)
ANION GAP SERPL CALC-SCNC: 14 MMOL/L
APPEARANCE: CLEAR
AST SERPL-CCNC: 22 U/L
BACTERIA: NEGATIVE
BASOPHILS # BLD AUTO: 0.05 K/UL
BASOPHILS NFR BLD AUTO: 0.5 %
BILIRUB SERPL-MCNC: 0.5 MG/DL
BILIRUBIN URINE: NEGATIVE
BLOOD URINE: NEGATIVE
BUN SERPL-MCNC: 30 MG/DL
BUN SERPL-MCNC: 39 MG/DL — HIGH (ref 7–23)
CALCIUM SERPL-MCNC: 10.2 MG/DL
CALCIUM SERPL-MCNC: 9.9 MG/DL — SIGNIFICANT CHANGE UP (ref 8.4–10.5)
CHLORIDE SERPL-SCNC: 101 MMOL/L
CHLORIDE SERPL-SCNC: 107 MMOL/L — SIGNIFICANT CHANGE UP (ref 96–108)
CHOLEST SERPL-MCNC: 124 MG/DL
CO2 SERPL-SCNC: 22 MMOL/L — SIGNIFICANT CHANGE UP (ref 22–31)
CO2 SERPL-SCNC: 24 MMOL/L
COLOR: NORMAL
COVID-19 NUCLEOCAPSID  GAM ANTIBODY INTERPRETATION: NEGATIVE
COVID-19 SPIKE DOMAIN ANTIBODY INTERPRETATION: POSITIVE
CREAT SERPL-MCNC: 2.11 MG/DL
CREAT SERPL-MCNC: 2.39 MG/DL — HIGH (ref 0.5–1.3)
CREAT SPEC-SCNC: 46 MG/DL
CREAT/PROT UR: 0.6 RATIO
EOSINOPHIL # BLD AUTO: 0.16 K/UL
EOSINOPHIL NFR BLD AUTO: 1.6 %
ESTIMATED AVERAGE GLUCOSE: 174 MG/DL
GLUCOSE QUALITATIVE U: ABNORMAL
GLUCOSE SERPL-MCNC: 200 MG/DL — HIGH (ref 70–99)
GLUCOSE SERPL-MCNC: 88 MG/DL
HBA1C MFR BLD HPLC: 7.7 %
HCT VFR BLD CALC: 52.1 %
HDLC SERPL-MCNC: 50 MG/DL
HGB BLD-MCNC: 16.8 G/DL
HYALINE CASTS: 0 /LPF
IMM GRANULOCYTES NFR BLD AUTO: 0.9 %
KETONES URINE: NEGATIVE
LDLC SERPL CALC-MCNC: 44 MG/DL
LEUKOCYTE ESTERASE URINE: NEGATIVE
LYMPHOCYTES # BLD AUTO: 2.04 K/UL
LYMPHOCYTES NFR BLD AUTO: 19.8 %
MAN DIFF?: NORMAL
MCHC RBC-ENTMCNC: 32.1 PG
MCHC RBC-ENTMCNC: 32.2 GM/DL
MCV RBC AUTO: 99.4 FL
MICROSCOPIC-UA: NORMAL
MONOCYTES # BLD AUTO: 1.22 K/UL
MONOCYTES NFR BLD AUTO: 11.9 %
NEUTROPHILS # BLD AUTO: 6.72 K/UL
NEUTROPHILS NFR BLD AUTO: 65.3 %
NITRITE URINE: NEGATIVE
NONHDLC SERPL-MCNC: 74 MG/DL
PH URINE: 6
PLATELET # BLD AUTO: 242 K/UL
POTASSIUM SERPL-MCNC: 4.2 MMOL/L — SIGNIFICANT CHANGE UP (ref 3.5–5.3)
POTASSIUM SERPL-SCNC: 4.2 MMOL/L — SIGNIFICANT CHANGE UP (ref 3.5–5.3)
POTASSIUM SERPL-SCNC: 6.7 MMOL/L
PROT SERPL-MCNC: 6.9 G/DL
PROT UR-MCNC: 28 MG/DL
PROTEIN URINE: ABNORMAL
PSA FREE FLD-MCNC: NORMAL %
PSA FREE SERPL-MCNC: <0.01 NG/ML
PSA SERPL-MCNC: <0.01 NG/ML
RBC # BLD: 5.24 M/UL
RBC # FLD: 14.2 %
RED BLOOD CELLS URINE: 0 /HPF
SARS-COV-2 AB SERPL IA-ACNC: >250 U/ML
SARS-COV-2 AB SERPL QL IA: 0.08 INDEX
SODIUM SERPL-SCNC: 139 MMOL/L
SODIUM SERPL-SCNC: 141 MMOL/L — SIGNIFICANT CHANGE UP (ref 135–145)
SPECIFIC GRAVITY URINE: 1.01
SQUAMOUS EPITHELIAL CELLS: 0 /HPF
T4 SERPL-MCNC: 6.4 UG/DL
TRIGL SERPL-MCNC: 155 MG/DL
TSH SERPL-ACNC: 1.6 UIU/ML
URATE SERPL-MCNC: 6.6 MG/DL
UROBILINOGEN URINE: NORMAL
WBC # FLD AUTO: 10.28 K/UL
WHITE BLOOD CELLS URINE: 0 /HPF

## 2022-02-25 PROCEDURE — 93005 ELECTROCARDIOGRAM TRACING: CPT

## 2022-02-25 PROCEDURE — 99284 EMERGENCY DEPT VISIT MOD MDM: CPT | Mod: 25

## 2022-02-25 PROCEDURE — 93010 ELECTROCARDIOGRAM REPORT: CPT

## 2022-02-25 PROCEDURE — 80048 BASIC METABOLIC PNL TOTAL CA: CPT

## 2022-02-25 PROCEDURE — 99283 EMERGENCY DEPT VISIT LOW MDM: CPT

## 2022-02-25 NOTE — ED PROVIDER NOTE - OBJECTIVE STATEMENT
75 year old male with PMH DM, HTN, CAD on aspirin, CKD presents to ER sent by nephrologist for elevated potassium to 6.7 on routine labs yesterday.  Reports get labs every 3 months, this is the first time he was told has elevated potassium.  Does take spironolactone however has been on medication for many years - no recent changes.  Denies fever, chills, CP, SOB, palpitations, lightheadedness, n/v/d, increase leg swelling, changes to urination

## 2022-02-25 NOTE — ED PROVIDER NOTE - CPE EDP MUSC NORM
----- Message from RENAE Montero sent at 11/10/2021  8:05 AM CST -----  CT shows changes in the left Kidney. I would like to have him follow up with Dr. Briggs related to this. He has a hernia in the left groin. Is he having pain in that area? Otherwise the CT is negative.   
Left message for patient. Paperwork placed in Dr Ronquillo box if agreeable to schedule.  
Patient called and was updated on message from Rajwinder Montague.  Patient reports he is having no pain to his left groin.  Offered to transfer patient call to Dr. Briggs's office to make appointment and patient declined and stated \"not at this time\".    
Patient returned phone call. Writer informed patient of results and Rajwinder MACDONALD's recommendations. Patient states he is not interested in having EGD completed at this time.  
Since the CT is negative I would recommend an EGD  
normal...

## 2022-02-25 NOTE — ED PROVIDER NOTE - NSFOLLOWUPINSTRUCTIONS_ED_ALL_ED_FT
You were evaluated for abnormal potassium level on outside labs. Your potassium was repeated and results today were within normal limits at 4.2      Please continue to take all your medication as previously prescribed.    Follow up with your nephrologist and primary care doctor    No signs of emergency medical condition on today's workup.  Presumptive diagnosis made, but further evaluation may be required by your primary care doctor or specialist for a definitive diagnosis.  Therefore, follow up as directed and if symptoms change/worsen or any emergency conditions, please return to the ER.

## 2022-02-25 NOTE — ED PROVIDER NOTE - NSICDXPASTMEDICALHX_GEN_ALL_CORE_FT
PAST MEDICAL HISTORY:  CAD (Coronary Artery Disease)     CKD (Chronic Kidney Disease)     Diabetes Mellitus     GERD (Gastroesophageal Reflux Disease)     HTN - Hypertension     Hyperlipidemia     Prostate Cancer

## 2022-02-25 NOTE — ED ADULT NURSE NOTE - OBJECTIVE STATEMENT
76 year old male presents to ED via walk-in complaining of abnormal lab results from blood work taken yesterday. Pt states the PMD told him to come in for a potassium of >6. Upon assessment A&O x4, ambulatory and well appearing. Pt denies symptoms, states he feels his normal self, and this has never happened before. Denies chest pain and shortness of breath. On bedside cardiac monitor. Bed locked and lowered. Comfort and safety measures maintained.

## 2022-02-25 NOTE — ED PROVIDER NOTE - CLINICAL SUMMARY MEDICAL DECISION MAKING FREE TEXT BOX
75 year old male with PMH DM, HTN, CAD on aspirin, CKD presents to ER sent by nephrologist for elevated potassium to 6.7 on routine labs yesterday.  No hx of hyperkalemia.  denies any symptoms at this time.  Reviewed EKG   Will repeat labs and reassess . 75 year old male with PMH DM, HTN, CAD on aspirin, CKD presents to ER sent by nephrologist for elevated potassium to 6.7 on routine labs yesterday.  No hx of hyperkalemia.  denies any symptoms at this time.  Reviewed EKG   Will repeat labs and reassess .    AMBAR Hurtado, Attending: seen with ACP and reviewed note.    Reports of hyperK in CKD pt. Suspected pseudohyperkalemia so will repeat.    EKG without signs fo hyperK. Known RBBB, unchanged.    K+, 4.1 on BMP here, Scr at baseline. No sx.    Non toxic. Steady gait. Non lateralizing neuro exam. Not confused or lethargic.     Stable for d/c.

## 2022-02-28 LAB — COPPER SERPL-MCNC: 130 UG/DL

## 2022-04-11 ENCOUNTER — APPOINTMENT (OUTPATIENT)
Dept: ORTHOPEDIC SURGERY | Facility: CLINIC | Age: 77
End: 2022-04-11
Payer: MEDICARE

## 2022-04-11 DIAGNOSIS — S40.011A CONTUSION OF RIGHT SHOULDER, INITIAL ENCOUNTER: ICD-10-CM

## 2022-04-11 PROCEDURE — 73030 X-RAY EXAM OF SHOULDER: CPT | Mod: RT

## 2022-04-11 PROCEDURE — 99214 OFFICE O/P EST MOD 30 MIN: CPT

## 2022-04-11 NOTE — CONSULT LETTER
[Dear  ___] : Dear  [unfilled], [Consult Letter:] : I had the pleasure of evaluating your patient, [unfilled]. [Please see my note below.] : Please see my note below. [Consult Closing:] : Thank you very much for allowing me to participate in the care of this patient.  If you have any questions, please do not hesitate to contact me. [Sincerely,] : Sincerely, [FreeTextEntry2] : LATONYA MCFARLAND  [FreeTextEntry3] : Ney Pascal M.D.

## 2022-04-11 NOTE — DISCUSSION/SUMMARY
[de-identified] : I discussed the underlying pathophysiology of the patient's condition in great detail with the patient and his wife. I went over the patient's x-rays with them in great detail. I informed them he has a fracture and no surgical intervention is needed at this time. I recommend he should not lift anything and wear an arm sling. A sling was provided. The patient was instructed in ROM exercises they are to do at home, with an exercise sheet provided. He was given a stress ball for hand pumping exercises. We discussed the use of ice and Tylenol to relieve pain. I do not recommend he currently drives long distances. He should follow-up in 2 weeks for repeat right shoulder x-rays. All of his questions were answered. He understands and consents to the plan.\par \par FU in 2 weeks for repeat right shoulder x-rays.

## 2022-04-11 NOTE — HISTORY OF PRESENT ILLNESS
[de-identified] : 76 year old RHD male presents complaining of right shoulder pain s/p fall 6 days ago (4/5/22). He fell out of a chair and landed on ceramic tile directly on the right side including the tip of the shoulder. He fell asleep on the chair. His ROM is extremely limited and painful. He does have bruising. Patient denies that he has any numbness or tingling. He was not experiencing right shoulder issues before the fall. He has been driving.

## 2022-04-11 NOTE — PHYSICAL EXAM
[de-identified] : Constitutional\par o Appearance : well-nourished, well developed, alert, in no acute distress \par Head and Face\par o Head :\par ¦ Inspection : atraumatic, normocephalic\par o Face :\par ¦ Inspection : no visible rash or discoloration\par Respiratory\par o Respiratory Effort: breathing unlabored \par Neurologic\par o Sensation : Normal sensation \par Psychiatric\par o Mood and Affect: mood normal, affect appropriate \par Lymphatic\par o Additional Nodes : No palpable lymph nodes present \par \par Cervical Spine\par o Inspection/Palpation :\par ¦ Inspection : alignment midline, normal degree of lordosis present\par ¦ Skin : normal appearance, no masses or tenderness, trachea midline\par ¦ Palpation : musculature is nontender to palpation\par o Range of Motion : arc of motion full in all planes, no crepitance or pain with ROM\par o Tests: Negative Spurling’s test \par \par Right Upper Extremity\par o Right Shoulder :\par ¦ Inspection/Palpation : tenderness over the acromion, ecchymosis \par ¦ Range of Motion : limited by pain\par ¦ Strength :  limited by pain\par ¦ Stability : not tested\par ¦ Tests: not tested\par \par Left Upper Extremity\par o Left Shoulder :\par ¦ Inspection/Palpation : no tenderness, no swelling or deformities\par ¦ Range of Motion : full and painless in all planes, no crepitance\par ¦ Strength :  forward elevation, internal rotation 5/5, external rotation 5/5, external rotation at 90 degrees of abduction, supraspinatus, adduction and abduction, biceps/triceps, 5/5\par ¦ Stability : no joint instability on provocative testing\par ¦ Tests: Cardenas negative, Neer test negative, Fernando test negative, drop arm test negative\par \par Radiology Results \par o Right Shoulder : AP internal/external rotation and outlet views were obtained and reveal a fracture of the scapular spine.

## 2022-04-11 NOTE — ADDENDUM
[FreeTextEntry1] : I, Sacha Patino, acted solely as a scribe for Dr. Ney Pascal on this date 04/11/2022.\par All medical record entries made by the Scribe were at my, Dr. Ney Pascal, direction and personally dictated by me on 04/11/2022. I have reviewed the chart and agree that the record accurately reflects my personal performance of the history, physical exam, assessment and plan. I have also personally directed, reviewed, and agreed with the chart.

## 2022-04-28 ENCOUNTER — APPOINTMENT (OUTPATIENT)
Dept: ORTHOPEDIC SURGERY | Facility: CLINIC | Age: 77
End: 2022-04-28
Payer: MEDICARE

## 2022-04-28 PROCEDURE — 99213 OFFICE O/P EST LOW 20 MIN: CPT

## 2022-04-28 PROCEDURE — 73030 X-RAY EXAM OF SHOULDER: CPT | Mod: RT

## 2022-04-28 NOTE — HISTORY OF PRESENT ILLNESS
[de-identified] : 76 year old RHD male presents for follow-up evaluation of his right shoulder scapular spine fx s/p fall on 4/5/2022. He fell asleep on a chair and fell to the right onto a ceramic tile. He did develop bruising. He is doing ROM exercises and wearing a sling. He notes his shoulder is doing better. He has more motion and less pain. Patient denies that he has any numbness or tingling. He was not experiencing right shoulder issues before the fall. He presents for repeat x-rays today.

## 2022-04-28 NOTE — PHYSICAL EXAM
[de-identified] : Constitutional\par o Appearance : well-nourished, well developed, alert, in no acute distress \par Head and Face\par o Head :\par ¦ Inspection : atraumatic, normocephalic\par o Face :\par ¦ Inspection : no visible rash or discoloration\par Respiratory\par o Respiratory Effort: breathing unlabored \par Neurologic\par o Sensation : Normal sensation \par Psychiatric\par o Mood and Affect: mood normal, affect appropriate \par Lymphatic\par o Additional Nodes : No palpable lymph nodes present \par \par Cervical Spine\par o Inspection/Palpation :\par ¦ Inspection : alignment midline, normal degree of lordosis present\par ¦ Skin : normal appearance, no masses or tenderness, trachea midline\par ¦ Palpation : musculature is nontender to palpation\par o Range of Motion : arc of motion full in all planes, no crepitance or pain with ROM\par o Tests: Negative Spurling’s test \par \par Right Upper Extremity\par o Right Shoulder :\par ¦ Inspection/Palpation : mild tenderness of the scapular spine\par ¦ Range of Motion : full forward flexion, full external rotation, no pain with internal rotation \par ¦ Strength : forward flexion 4/5, supraspinatus 4/5\par ¦ Stability : not tested\par ¦ Tests: not tested\par \par Left Upper Extremity\par o Left Shoulder :\par ¦ Inspection/Palpation : no tenderness, no swelling or deformities\par ¦ Range of Motion : full and painless in all planes, no crepitance\par ¦ Strength :  forward elevation, internal rotation 5/5, external rotation 5/5, external rotation at 90 degrees of abduction, supraspinatus, adduction and abduction, biceps/triceps, 5/5\par ¦ Stability : no joint instability on provocative testing\par ¦ Tests: Cardenas negative, Neer test negative, Fernando test negative, drop arm test negative\par \par Radiology Results \par o Right Shoulder : AP internal/external rotation and outlet views were obtained and reveal a fracture of the scapular spine with early callus present, concentric reduction of the glenohumeral joint.

## 2022-04-28 NOTE — ADDENDUM
[FreeTextEntry1] : I, Sacha Patino, acted solely as a scribe for Dr. Ney Pascal on this date 04/28/2022.\par All medical record entries made by the Scribe were at my, Dr. Ney Pascal, direction and personally dictated by me on 04/28/2022. I have reviewed the chart and agree that the record accurately reflects my personal performance of the history, physical exam, assessment and plan. I have also personally directed, reviewed, and agreed with the chart.

## 2022-04-28 NOTE — DISCUSSION/SUMMARY
[de-identified] : I discussed the underlying pathophysiology of the patient's condition in great detail with the patient. I went over the patient's x-rays with them in great detail. I advised him that the fracture will take 3 months to heal. He should still avoid heavy lifting. The patient was instructed in ROM exercises they are to do at home, with an exercise sheet provided. He should follow-up in 1 month for repeat right shoulder x-rays. All of his questions were answered. He understands and consents to the plan.\par \par FU in 1 month for repeat right shoulder x-rays.

## 2022-05-12 ENCOUNTER — APPOINTMENT (OUTPATIENT)
Dept: NEPHROLOGY | Facility: CLINIC | Age: 77
End: 2022-05-12
Payer: MEDICARE

## 2022-05-12 VITALS
SYSTOLIC BLOOD PRESSURE: 117 MMHG | WEIGHT: 230 LBS | OXYGEN SATURATION: 95 % | HEIGHT: 67 IN | HEART RATE: 80 BPM | TEMPERATURE: 97.3 F | DIASTOLIC BLOOD PRESSURE: 72 MMHG | BODY MASS INDEX: 36.1 KG/M2

## 2022-05-12 PROCEDURE — 99214 OFFICE O/P EST MOD 30 MIN: CPT

## 2022-05-12 NOTE — HISTORY OF PRESENT ILLNESS
[FreeTextEntry1] : Feels well\par DM Nephropathy\par Gaining weight\par \par Still gaining weight\par Moving to [place], [place] in 4 weeks\par Feels well\par No new sxs\par Broke R scapula/spine\par

## 2022-05-12 NOTE — ASSESSMENT
[FreeTextEntry1] : Vini is a 70-year-old diabetic gentleman who previously had been obese \par His blood pressure was well controlled and there was no edema. His renal function has been stable for 3 years although he had slightly more protein in the urine last week than usual. Overall, I thought he was doing quite well.\par Gained a lot of weight\par 220 lbs\par Feels well\par \par Still gaining weight\par Moving to [place], [place] in 4 weeks\par Feels well\par No new sxs\par Broke R scapula/spine\par CKD- SCr stable now but increasing over time\par BP excellent\par Must lose weight\par The total time of preparation for this visit, the visit itself and writing the note was 36 minutes\par \par \par \par \par

## 2022-05-18 LAB
25(OH)D3 SERPL-MCNC: 33.9 NG/ML
ALBUMIN SERPL ELPH-MCNC: 4 G/DL
ALP BLD-CCNC: 128 U/L
ALT SERPL-CCNC: 30 U/L
ANION GAP SERPL CALC-SCNC: 17 MMOL/L
AST SERPL-CCNC: 30 U/L
BASOPHILS # BLD AUTO: 0.04 K/UL
BASOPHILS NFR BLD AUTO: 0.4 %
BILIRUB SERPL-MCNC: 0.5 MG/DL
BUN SERPL-MCNC: 33 MG/DL
CALCIUM SERPL-MCNC: 9.6 MG/DL
CALCIUM SERPL-MCNC: 9.6 MG/DL
CHLORIDE SERPL-SCNC: 104 MMOL/L
CHOLEST SERPL-MCNC: 134 MG/DL
CO2 SERPL-SCNC: 21 MMOL/L
CREAT SERPL-MCNC: 1.77 MG/DL
EGFR: 39 ML/MIN/1.73M2
EOSINOPHIL # BLD AUTO: 0.2 K/UL
EOSINOPHIL NFR BLD AUTO: 2.2 %
ESTIMATED AVERAGE GLUCOSE: 186 MG/DL
FERRITIN SERPL-MCNC: 144 NG/ML
FOLATE SERPL-MCNC: 13.5 NG/ML
GLUCOSE SERPL-MCNC: 49 MG/DL
HBA1C MFR BLD HPLC: 8.1 %
HCT VFR BLD CALC: 46.6 %
HDLC SERPL-MCNC: 51 MG/DL
HGB BLD-MCNC: 15 G/DL
IMM GRANULOCYTES NFR BLD AUTO: 0.3 %
IRON SATN MFR SERPL: 24 %
IRON SERPL-MCNC: 72 UG/DL
LDLC SERPL CALC-MCNC: 68 MG/DL
LYMPHOCYTES # BLD AUTO: 2.27 K/UL
LYMPHOCYTES NFR BLD AUTO: 25.4 %
MAN DIFF?: NORMAL
MCHC RBC-ENTMCNC: 32.1 PG
MCHC RBC-ENTMCNC: 32.2 GM/DL
MCV RBC AUTO: 99.6 FL
MONOCYTES # BLD AUTO: 1.02 K/UL
MONOCYTES NFR BLD AUTO: 11.4 %
NEUTROPHILS # BLD AUTO: 5.36 K/UL
NEUTROPHILS NFR BLD AUTO: 60.3 %
NONHDLC SERPL-MCNC: 83 MG/DL
PARATHYROID HORMONE INTACT: 123 PG/ML
PHOSPHATE SERPL-MCNC: 3 MG/DL
PLATELET # BLD AUTO: 247 K/UL
POTASSIUM SERPL-SCNC: 4.8 MMOL/L
PROT SERPL-MCNC: 6.2 G/DL
RBC # BLD: 4.68 M/UL
RBC # FLD: 14.7 %
SODIUM SERPL-SCNC: 142 MMOL/L
T4 SERPL-MCNC: 6.1 UG/DL
TIBC SERPL-MCNC: 293 UG/DL
TRIGL SERPL-MCNC: 75 MG/DL
TSH SERPL-ACNC: 1.58 UIU/ML
UIBC SERPL-MCNC: 222 UG/DL
URATE SERPL-MCNC: 6.5 MG/DL
VIT B12 SERPL-MCNC: 338 PG/ML
WBC # FLD AUTO: 8.92 K/UL

## 2022-06-08 ENCOUNTER — APPOINTMENT (OUTPATIENT)
Dept: ORTHOPEDIC SURGERY | Facility: CLINIC | Age: 77
End: 2022-06-08
Payer: MEDICARE

## 2022-06-08 DIAGNOSIS — S42.191D: ICD-10-CM

## 2022-06-08 PROCEDURE — 73030 X-RAY EXAM OF SHOULDER: CPT | Mod: RT

## 2022-06-08 PROCEDURE — 99213 OFFICE O/P EST LOW 20 MIN: CPT

## 2022-06-08 NOTE — HISTORY OF PRESENT ILLNESS
[de-identified] : 76 year old RHD male presents for follow-up evaluation of right shoulder scapular spine fx s/p fall on 4/5/2022. He fell asleep on a chair and fell to the right onto a ceramic tile. He notes his shoulder is doing better. He has good motion and minimal pain. He had very slight discomfort over the scapular spine about 2 weeks ago when he was lifting boxes. He is currently moving to [place]. Patient denies that he has any numbness or tingling. He was not experiencing right shoulder issues before the fall. He presents for repeat x-rays today.

## 2022-06-08 NOTE — ADDENDUM
[FreeTextEntry1] : I, Sacha Patino, acted solely as a scribe for Dr. Ney Pascal on this date 06/08/2022.\par All medical record entries made by the Scribe were at my, Dr. Ney Pascal, direction and personally dictated by me on 06/08/2022. I have reviewed the chart and agree that the record accurately reflects my personal performance of the history, physical exam, assessment and plan. I have also personally directed, reviewed, and agreed with the chart.

## 2022-06-08 NOTE — DISCUSSION/SUMMARY
[de-identified] : I discussed the underlying pathophysiology of the patient's condition in great detail with the patient. I went over the patient's x-rays with them in great detail. I advised him the fracture is fully healed and he does not need any further treatment. Patient is advised that he has no restrictions and can return to normal activity. We discussed the use of ice to relieve pain. All of his questions were answered. He understands and consents to the plan.\par \par FU PRN

## 2022-06-08 NOTE — PHYSICAL EXAM
[de-identified] : Constitutional\par o Appearance : well-nourished, well developed, alert, in no acute distress \par Head and Face\par o Head :\par ¦ Inspection : atraumatic, normocephalic\par o Face :\par ¦ Inspection : no visible rash or discoloration\par Respiratory\par o Respiratory Effort: breathing unlabored \par Neurologic\par o Sensation : Normal sensation \par Psychiatric\par o Mood and Affect: mood normal, affect appropriate \par Lymphatic\par o Additional Nodes : No palpable lymph nodes present \par \par Cervical Spine\par o Inspection/Palpation :\par ¦ Inspection : alignment midline, normal degree of lordosis present\par ¦ Skin : normal appearance, no masses or tenderness, trachea midline\par ¦ Palpation : musculature is nontender to palpation\par o Range of Motion : arc of motion full in all planes, no crepitance or pain with ROM\par o Tests: Negative Spurling’s test \par \par Right Upper Extremity\par o Right Shoulder :\par ¦ Inspection/Palpation : no tenderness of the scapular spine, no swelling or deformities\par ¦ Range of Motion : full forward flexion, full external rotation, symmetrical internal rotation that is limited\par ¦ Strength : forward elevation 5/5, internal rotation 5/5, external rotation 5/5, external rotation at 90° of abduction 5/5, supraspinatus 5/5, adduction and abduction 5/5, biceps/triceps 5/5 \par ¦ Stability : no instability\par ¦ Tests: negative drop arm test\par \par Left Upper Extremity\par o Left Shoulder :\par ¦ Inspection/Palpation : no tenderness, no swelling or deformities\par ¦ Range of Motion : full and painless in all planes, no crepitance\par ¦ Strength :  forward elevation, internal rotation 5/5, external rotation 5/5, external rotation at 90 degrees of abduction, supraspinatus, adduction and abduction, biceps/triceps, 5/5\par ¦ Stability : no joint instability on provocative testing\par ¦ Tests: Cardenas negative, Neer test negative, Fernando test negative, drop arm test negative\par \par Radiology Results \par o Right Shoulder : AP internal/external rotation and outlet views were obtained and reveal a downsloping acromion with a subacromial spur. Progressive healing of his scapular spine fracture.

## 2022-11-11 RX ORDER — PRAZOSIN HYDROCHLORIDE 1 MG/1
1 CAPSULE ORAL
Qty: 60 | Refills: 2 | Status: ACTIVE | COMMUNITY
Start: 2018-07-19 | End: 1900-01-01

## 2023-05-18 NOTE — ED PROVIDER NOTE - CROS ED CONS ALL NEG
negative... Interpolation Flap Text: A decision was made to reconstruct the defect utilizing an interpolation axial flap and a staged reconstruction.  A telfa template was made of the defect.  This telfa template was then used to outline the interpolation flap.  The donor area for the pedicle flap was then injected with anesthesia.  The flap was excised through the skin and subcutaneous tissue down to the layer of the underlying musculature.  The interpolation flap was carefully excised within this deep plane to maintain its blood supply.  The edges of the donor site were undermined.   The donor site was closed in a primary fashion.  The pedicle was then rotated into position and sutured.  Once the tube was sutured into place, adequate blood supply was confirmed with blanching and refill.  The pedicle was then wrapped with xeroform gauze and dressed appropriately with a telfa and gauze bandage to ensure continued blood supply and protect the attached pedicle.

## 2023-08-20 NOTE — ED PROVIDER NOTE - PATIENT PORTAL LINK FT
No You can access the FollowMyHealth Patient Portal offered by [facility] by registering at the following website: [email or web address]. By joining [facility]’s FollowMyHealth portal, you will also be able to view your health information using other applications (apps) compatible with our system.